# Patient Record
Sex: MALE | Race: WHITE | Employment: FULL TIME | URBAN - NONMETROPOLITAN AREA
[De-identification: names, ages, dates, MRNs, and addresses within clinical notes are randomized per-mention and may not be internally consistent; named-entity substitution may affect disease eponyms.]

---

## 2017-01-04 ENCOUNTER — OFFICE VISIT (OUTPATIENT)
Dept: FAMILY MEDICINE | Facility: OTHER | Age: 52
End: 2017-01-04
Payer: COMMERCIAL

## 2017-01-04 VITALS
HEIGHT: 74 IN | WEIGHT: 192 LBS | DIASTOLIC BLOOD PRESSURE: 62 MMHG | TEMPERATURE: 96.5 F | RESPIRATION RATE: 16 BRPM | HEART RATE: 59 BPM | SYSTOLIC BLOOD PRESSURE: 98 MMHG | BODY MASS INDEX: 24.64 KG/M2 | OXYGEN SATURATION: 99 %

## 2017-01-04 DIAGNOSIS — Z00.00 ENCOUNTER FOR ROUTINE ADULT HEALTH EXAMINATION WITHOUT ABNORMAL FINDINGS: Primary | ICD-10-CM

## 2017-01-04 DIAGNOSIS — Z13.6 CARDIOVASCULAR SCREENING; LDL GOAL LESS THAN 160: ICD-10-CM

## 2017-01-04 DIAGNOSIS — H90.3 ASYMMETRICAL SENSORINEURAL HEARING LOSS: ICD-10-CM

## 2017-01-04 DIAGNOSIS — Z23 NEED FOR VACCINATION: ICD-10-CM

## 2017-01-04 DIAGNOSIS — Z23 NEED FOR PROPHYLACTIC VACCINATION AND INOCULATION AGAINST INFLUENZA: ICD-10-CM

## 2017-01-04 LAB — GLUCOSE SERPL-MCNC: 109 MG/DL (ref 70–99)

## 2017-01-04 PROCEDURE — 90715 TDAP VACCINE 7 YRS/> IM: CPT | Performed by: FAMILY MEDICINE

## 2017-01-04 PROCEDURE — 82947 ASSAY GLUCOSE BLOOD QUANT: CPT | Mod: 90 | Performed by: FAMILY MEDICINE

## 2017-01-04 PROCEDURE — 36415 COLL VENOUS BLD VENIPUNCTURE: CPT | Performed by: FAMILY MEDICINE

## 2017-01-04 PROCEDURE — 99396 PREV VISIT EST AGE 40-64: CPT | Mod: 25 | Performed by: FAMILY MEDICINE

## 2017-01-04 PROCEDURE — 99000 SPECIMEN HANDLING OFFICE-LAB: CPT | Performed by: FAMILY MEDICINE

## 2017-01-04 PROCEDURE — 90471 IMMUNIZATION ADMIN: CPT | Performed by: FAMILY MEDICINE

## 2017-01-04 ASSESSMENT — PAIN SCALES - GENERAL: PAINLEVEL: NO PAIN (0)

## 2017-01-04 NOTE — NURSING NOTE
"Chief Complaint   Patient presents with     Physical       Initial BP 98/62 mmHg  Pulse 59  Temp(Src) 96.5  F (35.8  C) (Temporal)  Resp 16  Ht 6' 1.5\" (1.867 m)  Wt 192 lb (87.091 kg)  BMI 24.99 kg/m2  SpO2 99% Estimated body mass index is 24.99 kg/(m^2) as calculated from the following:    Height as of this encounter: 6' 1.5\" (1.867 m).    Weight as of this encounter: 192 lb (87.091 kg).  BP completed using cuff size: regular  Olivia Hough MA 1/4/2017        "

## 2017-01-04 NOTE — PROGRESS NOTES
SUBJECTIVE:     CC: Frandy Jerez is an 51 year old male who presents for preventative health visit.     Healthy Habits:    Do you get at least three servings of calcium containing foods daily (dairy, green leafy vegetables, etc.)? yes    Amount of exercise or daily activities, outside of work: 2 day(s) per week    Problems taking medications regularly not applicable    Medication side effects: No    Have you had an eye exam in the past two years? yes    Do you see a dentist twice per year? yes    Do you have sleep apnea, excessive snoring or daytime drowsiness?no        Hearing loss in right ear      Duration: past year noticed it    Description (location/character/radiation): na    Intensity:  NA    Accompanying signs and symptoms: NA    History (similar episodes/previous evaluation): None    Precipitating or alleviating factors: None    Therapies tried and outcome: None       Today's PHQ-2 Score:   PHQ-2 ( 1999 [employer]) 9/15/2016 3/1/2012   Q1: Little interest or pleasure in doing things 0 0   Q2: Feeling down, depressed or hopeless 0 0   PHQ-2 Score 0 0       Abuse: Current or Past(Physical, Sexual or Emotional)- No  Do you feel safe in your environment - Yes    Social History   Substance Use Topics     Smoking status: Never Smoker      Smokeless tobacco: Never Used     Alcohol Use: 0.0 oz/week     0 Standard drinks or equivalent per week      Comment: rare     The patient does not drink >3 drinks per day nor >7 drinks per week.    Last PSA: No results found for: PSA    Recent Labs   Lab Test  03/01/12   0843  01/27/11   1015   CHOL  187  226*   HDL  39*  42   LDL  128  155*   TRIG  98  146   CHOLHDLRATIO  5.0  5.0       Reviewed orders with patient. Reviewed health maintenance and updated orders accordingly - Yes    All Histories reviewed and updated in Epic.      ROS:  C: NEGATIVE for fever, chills, change in weight  I: NEGATIVE for worrisome rashes, moles or lesions  E: NEGATIVE for vision changes or  irritation  ENT: hearing loss  R: NEGATIVE for significant cough or SOB  CV: NEGATIVE for chest pain, palpitations or peripheral edema  GI: NEGATIVE for nausea, abdominal pain, heartburn, or change in bowel habits   male: negative for dysuria, hematuria, decreased urinary stream, erectile dysfunction, urethral discharge  M: NEGATIVE for significant arthralgias or myalgia  N: NEGATIVE for weakness, dizziness or paresthesias  P: NEGATIVE for changes in mood or affect    Problem list, Medication list, Allergies, and Medical/Social/Surgical histories reviewed in [facility] and updated as appropriate.  BP Readings from Last 3 Encounters:   01/04/17 98/62   10/19/16 114/52   09/15/16 124/70    Wt Readings from Last 3 Encounters:   01/04/17 192 lb (87.091 kg)   10/19/16 189 lb 9.6 oz (86.002 kg)   09/15/16 186 lb (84.369 kg)                  Patient Active Problem List   Diagnosis     CARDIOVASCULAR SCREENING; LDL GOAL LESS THAN 160     GERD (gastroesophageal reflux disease)     Past Surgical History   Procedure Laterality Date     Surgical history of -        great toenail removed, both feet     Surgical history of -        wisdom teeth     Colonoscopy  2009     Hernia repair, inguinal rt/lt  09/09/10     Bilateral     Eye surgery         Social History   Substance Use Topics     Smoking status: Never Smoker      Smokeless tobacco: Never Used     Alcohol Use: 0.0 oz/week     0 Standard drinks or equivalent per week      Comment: rare     Family History   Problem Relation Age of Onset     Thyroid Disease Mother      Asthma Father      Colon Cancer No family hx of      Breast Cancer No family hx of      Prostate Cancer No family hx of      DIABETES No family hx of      Coronary Artery Disease Paternal Grandfather      Hypertension No family hx of      Hyperlipidemia No family hx of      CEREBROVASCULAR DISEASE No family hx of      Macular Degeneration Mother          No current outpatient prescriptions on file.     No Known  "Allergies  Recent Labs   Lab Test  03/01/12   0843  01/27/11   1015   LDL  128  155*   HDL  39*  42   TRIG  98  146   ALT  46   --    CR  0.97   --    GFRESTIMATED  84   --    GFRESTBLACK  >90   --    POTASSIUM  4.2   --       OBJECTIVE:     BP 98/62 mmHg  Pulse 59  Temp(Src) 96.5  F (35.8  C) (Temporal)  Resp 16  Ht 6' 1.5\" (1.867 m)  Wt 192 lb (87.091 kg)  BMI 24.99 kg/m2  SpO2 99%  EXAM:  GENERAL: healthy, alert and no distress  EYES: Eyes grossly normal to inspection, PERRL and conjunctivae and sclerae normal  HENT: ear canals and TM's normal, nose and mouth without ulcers or lesions  NECK: no adenopathy, no asymmetry, masses, or scars and thyroid normal to palpation  RESP: lungs clear to auscultation - no rales, rhonchi or wheezes  CV: regular rate and rhythm, normal S1 S2, no S3 or S4, no murmur, click or rub, no peripheral edema and peripheral pulses strong  ABDOMEN: soft, nontender, no hepatosplenomegaly, no masses and bowel sounds normal   (male): normal male genitalia without lesions or urethral discharge, no hernia  MS: no gross musculoskeletal defects noted, no edema  SKIN: no suspicious lesions or rashes  NEURO: Normal strength and tone, mentation intact and speech normal  PSYCH: mentation appears normal, affect normal/bright  LYMPH: no cervical, supraclavicular, axillary, or inguinal adenopathy    ASSESSMENT/PLAN:         ICD-10-CM    1. CARDIOVASCULAR SCREENING; LDL GOAL LESS THAN 160 Z13.6 Lipid panel reflex to direct LDL   2. Encounter for routine adult health examination without abnormal findings Z00.00 GLUCOSE     Lipid panel reflex to direct LDL   3. Need for prophylactic vaccination and inoculation against influenza Z23 TDAP VACCINE (BOOSTRIX AGES 10-64)   4. Asymmetrical sensorineural hearing loss H90.5 AUDIOLOGY ADULT REFERRAL       COUNSELING:  Reviewed preventive health counseling, as reflected in patient instructions       Regular exercise       Healthy diet/nutrition       Vision " "screening       Vaccinated for: TDAP         reports that he has never smoked. He has never used smokeless tobacco.    Estimated body mass index is 24.99 kg/(m^2) as calculated from the following:    Height as of this encounter: 6' 1.5\" (1.867 m).    Weight as of this encounter: 192 lb (87.091 kg).       Counseling Resources:  ATP IV Guidelines  Pooled Cohorts Equation Calculator  FRAX Risk Assessment  ICSI Preventive Guidelines  Dietary Guidelines for Americans, 2010  USDA's MyPlate  ASA Prophylaxis  Lung CA Screening    Tk Acevedo MD  [facility]  "

## 2017-01-04 NOTE — PATIENT INSTRUCTIONS
Preventive Health Recommendations  Male Ages 40 to 49    Yearly exam:             See your health care provider every year in order to  o   Review health changes.   o   Discuss preventive care.    o   Review your medicines if your doctor has prescribed any.    You should be tested each year for STDs (sexually transmitted diseases) if you re at risk.     Have a cholesterol test every 5 years.     Have a colonoscopy (test for colon cancer) if someone in your family has had colon cancer or polyps before age 50.     After age 45, have a diabetes test (fasting glucose). If you are at risk for diabetes, you should have this test every 3 years.      Talk with your health care provider about whether or not a prostate cancer screening test (PSA) is right for you.    Shots: Get a flu shot each year. Get a tetanus shot every 10 years.     Nutrition:    Eat at least 5 servings of fruits and vegetables daily.     Eat whole-grain bread, whole-wheat pasta and brown rice instead of white grains and rice.     Talk to your provider about Calcium and Vitamin D.     Lifestyle    Exercise for at least 150 minutes a week (30 minutes a day, 5 days a week). This will help you control your weight and prevent disease.     Limit alcohol to one drink per day.     No smoking.     Wear sunscreen to prevent skin cancer.     See your dentist every six months for an exam and cleaning.

## 2017-01-04 NOTE — MR AVS SNAPSHOT
After Visit Summary   1/4/2017    Frandy Jerez    MRN: [id number]           Patient Information     Date Of Birth          1965        Visit Information        Provider Department      1/4/2017 8:20 AM Tk Acevedo MD [facility]        Today's Diagnoses     CARDIOVASCULAR SCREENING; LDL GOAL LESS THAN 160    -  1     Encounter for routine adult health examination without abnormal findings         Need for prophylactic vaccination and inoculation against influenza         Asymmetrical sensorineural hearing loss           Care Instructions      Preventive Health Recommendations  Male Ages 40 to 49    Yearly exam:             See your health care provider every year in order to  o   Review health changes.   o   Discuss preventive care.    o   Review your medicines if your doctor has prescribed any.    You should be tested each year for STDs (sexually transmitted diseases) if you re at risk.     Have a cholesterol test every 5 years.     Have a colonoscopy (test for colon cancer) if someone in your family has had colon cancer or polyps before age 50.     After age 45, have a diabetes test (fasting glucose). If you are at risk for diabetes, you should have this test every 3 years.      Talk with your health care provider about whether or not a prostate cancer screening test (PSA) is right for you.    Shots: Get a flu shot each year. Get a tetanus shot every 10 years.     Nutrition:    Eat at least 5 servings of fruits and vegetables daily.     Eat whole-grain bread, whole-wheat pasta and brown rice instead of white grains and rice.     Talk to your provider about Calcium and Vitamin D.     Lifestyle    Exercise for at least 150 minutes a week (30 minutes a day, 5 days a week). This will help you control your weight and prevent disease.     Limit alcohol to one drink per day.     No smoking.     Wear sunscreen to prevent skin cancer.     See your dentist every six months for an exam and  "cleaning.            Follow-ups after your visit        Additional Services     AUDIOLOGY ADULT REFERRAL       Your provider has referred you to: [facility]: [facility] [phone]   [email or web address]    Specialty Testing:  Audiogram w/Tymps and Reflexes (Comprehensive Audiology Evaluation)                  Follow-up notes from your care team     Return in about 1 year (around 1/4/2018) for Physical Exam.      Future tests that were ordered for you today     Open Future Orders        Priority Expected Expires Ordered    Lipid panel reflex to direct LDL Routine  1/4/2018 1/4/2017            Who to contact     If you have questions or need follow up information about today's clinic visit or your schedule please contact [facility] directly at [phone].  Normal or non-critical lab and imaging results will be communicated to you by MyChart, letter or phone within 4 business days after the clinic has received the results. If you do not hear from us within 7 days, please contact the clinic through [employer] or phone. If you have a critical or abnormal lab result, we will notify you by phone as soon as possible.  Submit refill requests through [employer] or call your pharmacy and they will forward the refill request to us. Please allow 3 business days for your refill to be completed.          Additional Information About Your Visit        [employer] Information     [employer] lets you send messages to your doctor, view your test results, renew your prescriptions, schedule appointments and more. To sign up, go to [email or web address] . Click on \"Log in\" on the left side of the screen, which will take you to the Welcome page. Then click on \"Sign up Now\" on the right side of the page.     You will be asked to enter the access code listed below, as well as some personal information. Please follow the directions to create your username and password.     Your " "access code is: [email or web address]  Expires: 2017  9:07 AM     Your access code will  in 90 days. If you need help or a new code, please call your [place] clinic or [phone].        Care EveryWhere ID     This is your Care EveryWhere ID. This could be used by other organizations to access your [place] medical records  [id number]        Your Vitals Were     Pulse Temperature Respirations Height BMI (Body Mass Index) Pulse Oximetry    59 96.5  F (35.8  C) (Temporal) 16 6' 1.5\" (1.867 m) 24.99 kg/m2 99%       Blood Pressure from Last 3 Encounters:   17 98/62   10/19/16 114/52   09/15/16 124/70    Weight from Last 3 Encounters:   17 192 lb (87.091 kg)   10/19/16 189 lb 9.6 oz (86.002 kg)   09/15/16 186 lb (84.369 kg)              We Performed the Following     AUDIOLOGY ADULT REFERRAL     GLUCOSE     TDAP VACCINE (BOOSTRIX AGES 10-64)        Primary Care Provider Office Phone # Fax #    Geraldo Avendano [phone]       [facility] [address] [place] [address]        Thank you!     Thank you for choosing [facility]  for your care. Our goal is always to provide you with excellent care. Hearing back from our patients is one way we can continue to improve our services. Please take a few minutes to complete the written survey that you may receive in the mail after your visit with us. Thank you!             Your Updated Medication List - Protect others around you: Learn how to safely use, store and throw away your medicines at www.disposemymeds.org.      Notice  As of 2017  9:07 AM    You have not been prescribed any medications.      "

## 2017-01-04 NOTE — NURSING NOTE
Screening Questionnaire for Adult Immunization    Are you sick today?   No   Do you have allergies to medications, food, a vaccine component or latex?   No   Have you ever had a serious reaction after receiving a vaccination?   No   Do you have a long-term health problem with heart disease, lung disease, asthma, kidney disease, metabolic disease (e.g. diabetes), anemia, or other blood disorder?   No   Do you have cancer, leukemia, HIV/AIDS, or any other immune system problem?   No   In the past 3 months, have you taken medications that affect  your immune system, such as prednisone, other steroids, or anticancer drugs; drugs for the treatment of rheumatoid arthritis, Crohn s disease, or psoriasis; or have you had radiation treatments?   No   Have you had a seizure, or a brain or other nervous system problem?   No   During the past year, have you received a transfusion of blood or blood     products, or been given immune (gamma) globulin or antiviral drug?   No   For women: Are you pregnant or is there a chance you could become        pregnant during the next month?   No   Have you received any vaccinations in the past 4 weeks?   No     Immunization questionnaire answers were all negative.      MNVFC doesn't apply on this patient    Per orders of Dr. Acevedo, injection of Dtap given by Uzma Hough. Patient instructed to remain in clinic for 20 minutes afterwards, and to report any adverse reaction to me immediately.       Screening performed by Uzma Hough on 1/4/2017 at 9:40 AM.

## 2017-01-13 ENCOUNTER — TELEPHONE (OUTPATIENT)
Dept: FAMILY MEDICINE | Facility: OTHER | Age: 52
End: 2017-01-13

## 2017-01-13 NOTE — TELEPHONE ENCOUNTER
Please call patient. Orders for glucose and lipids placed but unsure if completed.   Electronically signed by Tk Acevedo MD

## 2017-01-13 NOTE — TELEPHONE ENCOUNTER
Patient stated that he had had his cholesterol checked recently at a work health screening and it was normal. Olivia Crespo MA     1/13/2017

## 2017-01-13 NOTE — TELEPHONE ENCOUNTER
Reason for Call:  Request for results:    Name of test or procedure: lab    Date of test of procedure: 1/4/17    Location of the test or procedure: [facility]    OK to leave the result message on voice mail or with a family member? YES    Phone number Patient can be reached at:      Additional comments:     Call taken on 1/13/2017 at 10:22 AM by Kaitlynn Mchugh

## 2018-01-23 ENCOUNTER — TELEPHONE (OUTPATIENT)
Dept: FAMILY MEDICINE | Facility: OTHER | Age: 53
End: 2018-01-23

## 2018-01-23 NOTE — TELEPHONE ENCOUNTER
Reason for Call:  WED Day Appointment, Requested Provider:  Tk Acevedo M.D.    PCP: Geraldo Avendano    Reason for visit: Patient states he thinks he broke his collar bone & is requesting to see Dr Acevedo on Wed, patient was advised PCP is not in clinic today    Duration of symptoms: since Fri-boot hockey on ice    Have you been treated for this in the past? No    Additional comments:     Can we leave a detailed message on this number? YES    Phone number patient can be reached at: Home number on file [phone] (home)    Best Time: anytime    Call taken on 1/23/2018 at 10:03 AM by Alicia Perez

## 2018-01-23 NOTE — TELEPHONE ENCOUNTER
OK [place]in [place]. Please call patient  to schedule time.  Electronically signed by Tk Acevedo MD

## 2018-01-24 ENCOUNTER — OFFICE VISIT (OUTPATIENT)
Dept: FAMILY MEDICINE | Facility: CLINIC | Age: 53
End: 2018-01-24
Payer: COMMERCIAL

## 2018-01-24 ENCOUNTER — HOSPITAL ENCOUNTER (OUTPATIENT)
Dept: GENERAL RADIOLOGY | Facility: CLINIC | Age: 53
Discharge: HOME | End: 2018-01-24
Attending: FAMILY MEDICINE | Admitting: FAMILY MEDICINE
Payer: COMMERCIAL

## 2018-01-24 VITALS
TEMPERATURE: 97.7 F | SYSTOLIC BLOOD PRESSURE: 126 MMHG | HEART RATE: 73 BPM | DIASTOLIC BLOOD PRESSURE: 66 MMHG | BODY MASS INDEX: 24.92 KG/M2 | WEIGHT: 184 LBS | RESPIRATION RATE: 16 BRPM | HEIGHT: 72 IN | OXYGEN SATURATION: 99 %

## 2018-01-24 DIAGNOSIS — M89.8X1 CLAVICLE PAIN: Primary | ICD-10-CM

## 2018-01-24 DIAGNOSIS — M89.8X1 CLAVICLE PAIN: ICD-10-CM

## 2018-01-24 DIAGNOSIS — S43.52XA ACROMIOCLAVICULAR SPRAIN, LEFT, INITIAL ENCOUNTER: ICD-10-CM

## 2018-01-24 PROCEDURE — 73000 X-RAY EXAM OF COLLAR BONE: CPT | Mod: TC

## 2018-01-24 PROCEDURE — 99213 OFFICE O/P EST LOW 20 MIN: CPT | Performed by: FAMILY MEDICINE

## 2018-01-24 ASSESSMENT — PAIN SCALES - GENERAL: PAINLEVEL: NO PAIN (1)

## 2018-01-24 NOTE — PROGRESS NOTES
SUBJECTIVE:   Frandy Jerez is a 52 year old male who presents to clinic today for the following health issues:      Joint Pain    Onset: 01/19/18    Description:   Location: left shoulder- collarbone  Character: Dull ache, sometimes sharp with certain movements    Intensity: moderate    Progression of Symptoms: same    Accompanying Signs & Symptoms:  Other symptoms: swelling- minor, discoloration- some minor yellowing in the area    History:   Previous similar pain: YES- he fractured his shoulder 22 years ago      Precipitating factors:   Trauma or overuse: YES- he was playing boot hockey and fell on the ice landing on left shoulder    Alleviating factors:  Improved by: rest/inactivity, ibu    Therapies Tried and outcome: ibu            Problem list and histories reviewed & adjusted, as indicated.  Additional history: as documented    Patient Active Problem List   Diagnosis     CARDIOVASCULAR SCREENING; LDL GOAL LESS THAN 160     GERD (gastroesophageal reflux disease)     Past Surgical History:   Procedure Laterality Date     COLONOSCOPY  2009     EYE SURGERY       HERNIA REPAIR, INGUINAL RT/LT  09/09/10    Bilateral     SURGICAL HISTORY OF -       great toenail removed, both feet     SURGICAL HISTORY OF -       wisdom teeth       Social History   Substance Use Topics     Smoking status: Never Smoker     Smokeless tobacco: Never Used     Alcohol use 0.0 oz/week     0 Standard drinks or equivalent per week      Comment: rare     Family History   Problem Relation Age of Onset     Thyroid Disease Mother      Asthma Father      Colon Cancer No family hx of      Breast Cancer No family hx of      Prostate Cancer No family hx of      DIABETES No family hx of      Coronary Artery Disease Paternal Grandfather      Hypertension No family hx of      Hyperlipidemia No family hx of      CEREBROVASCULAR DISEASE No family hx of      Macular Degeneration Mother          Current Outpatient Prescriptions   Medication Sig Dispense  "Refill     IBUPROFEN PO        No Known Allergies  Recent Labs   Lab Test  03/01/12   0843  01/27/11   1015   LDL  128  155*   HDL  39*  42   TRIG  98  146   ALT  46   --    CR  0.97   --    GFRESTIMATED  84   --    GFRESTBLACK  >90   --    POTASSIUM  4.2   --       BP Readings from Last 3 Encounters:   01/24/18 126/66   01/04/17 98/62   10/19/16 114/52    Wt Readings from Last 3 Encounters:   01/24/18 184 lb (83.5 kg)   01/04/17 192 lb (87.1 kg)   10/19/16 189 lb 9.6 oz (86 kg)                  Labs reviewed in EPIC    Reviewed and updated as needed this visit by clinical staff  Tobacco  Allergies  Meds  Problems  Soc Hx      Reviewed and updated as needed this visit by Provider  Allergies  Meds  Problems         ROS:  Constitutional, HEENT, cardiovascular, pulmonary, gi and gu systems are negative, except as otherwise noted.    OBJECTIVE:     /66 (BP Location: Left arm, Patient Position: Chair, Cuff Size: Adult Large)  Pulse 73  Temp 97.7  F (36.5  C) (Tympanic)  Resp 16  Ht 6' 0.3\" (1.836 m)  Wt 184 lb (83.5 kg)  SpO2 99%  BMI 24.75 kg/m2  Body mass index is 24.75 kg/(m^2).  GENERAL: healthy, alert and no distress  NECK: no adenopathy, no asymmetry, masses, or scars and thyroid normal to palpation  RESP: lungs clear to auscultation - no rales, rhonchi or wheezes  CV: regular rate and rhythm, normal S1 S2, no S3 or S4, no murmur, click or rub, no peripheral edema and peripheral pulses strong  ABDOMEN: soft, nontender, no hepatosplenomegaly, no masses and bowel sounds normal  MS: Shoulder exam shows positive impingement signs are present with pain at high arc of abduction and forward flexion on left. There is tenderness of the AC joint, Old deformity midclavicular non tender. .    Diagnostic Test Results:  Xray - Left clavicle no acute fracture. Healed old fracture.     ASSESSMENT/PLAN:       1. Clavicle pain  Acute distal clavicle pain after fall on shoulder. No acute fracture.  - XR Clavicle " Left; Future    2. Acromioclavicular sprain, left, initial encounter  Acute AC sprain with minimal step off. Rest the affected painful area as much as possible.  Apply ice for 15-20 minutes intermittently as needed and especially after any offending activity. Daily stretching.  As pain recedes, begin normal activities slowly as tolerated.  Consider Physical Therapy if symptoms not better with symptomatic care.       Patient Instructions       AC Joint Sprain (Adult)    The AC or acromioclavicular joint is at the end of the collar bone, or clavicle, near the shoulder. The AC joint is made of 4 ligaments that hold the collar bone to the shoulder blade, or scapula. With an AC joint sprain, these ligaments may be partly or fully torn. In both cases this causes pain and swelling at the end of the collar bone. If the ligaments are completely torn, the collar bone will rise up.  AC joint sprains are given a grade depending on whether they are mild, moderate, or severe:    Grade 1. A mild sprain, with minor damage to the ligaments. The collar bone stays in place.    Grade 2. A moderate sprain. The ligaments are partly torn. The collar bone is moved out of place. The injured shoulder may look lower and flatter than the other shoulder.    Grade 3. The most severe kind of sprain. The ligaments are completely torn. The collar bone is no longer joined to the shoulder blade. The collar bone rises up. This creates a bump on top of the shoulder. The ligaments heal in this position, so the bump does not go away. It is possible to have surgery to correct the bump. But normal shoulder function will return even without surgery.  An AC sprain will take up to 6 weeks to heal, depending on how severe it is. It is often treated with a sling. Or a sling and an elastic wrap around the chest may be used. Physical therapy may be needed to help the shoulder keep full range of motion. Once healed, you can expect full recovery of shoulder  function.  Home care    Your provider may prescribe medicines for pain. Or you may use over-the-counter pain medicines. Talk with your provider beforetaking medicines if you have chronic liver or kidney disease, or have ever had a stomach ulcer or GI (gastrointestinal) bleeding.    Make an appointment right away to see your provider or an orthopedic or bone doctor, for further evaluation and treatment of the injury.    Use the injured area as little as possible. This will help decrease pain and swelling and allow the area to heal.    Place an ice pack over the injured area for 15 minutes. Do this every 4 to 6 hours for the first 24 to 48 hours, or as directed. Keep using ice packs to ease pain and swelling as directed by your provider or the orthopedic doctor.    To make an ice pack, put ice cubes in a plastic bag that seals at the top. Wrap the bag in a clean, thin towel or cloth. Never put ice or an ice pack directly on the skin. The ice pack can be put right on the wrap or sling. As the ice melts, be careful to not get the wrap or sling wet.    A sling alone is often enough. Sometime a sling and a wrap around the chest may be used to help ease pain, if needed. This also helps keep your injured arm from moving. Use these devices as advised until you are seen by your healthcare provider or the orthopedic doctor. Always ask when the wrap should be worn. Always ask when the wrap can be removed.    Wear the sling while awake or as advised, until you are scheduled to see your healthcare provider or an orthopedic doctor. You may remove the sling to sleep. You may remove the sling to bathe. Make sure the sling is comfortable and keeps your arm raised, as advised by the provider. Follow the provider s instructions on how to use the sling. Always ask when you should wear the sling. Always ask when the sling can be removed.    Shoulder joints become stiff if they are kept still for too long. Talk with your healthcare  provider or the orthopedic doctor about range of motion exercises and possible physical therapy.  Follow-up care  Follow up with your healthcare provider, or as advised. Your provider may refer you to a specialist such as an orthopedic, or bone, doctor.  If X-rays were taken, you will be told of any new findings that may affect your care.  When to seek medical advice  Call your healthcare provider right away if any of these occur:    Your shoulder looks off-balance    You have increased pain, swelling, or bruising    You have increased pain even after using prescribed pain medicine    You have continuing pain     Your hand or arm becomes cold, blue, numb, or tingly    You have trouble moving your shoulder, wrist, or elbow due to stiffness  Date Last Reviewed: 10/8/2015    4471-8502 [employer]. [address] [place] [address]. All rights reserved. This information is not intended as a substitute for professional medical care. Always follow your healthcare professional's instructions.        Understanding AC Joint Sprain    The AC (acromioclavicular) joint is where the shoulder blade (scapula) meets the collarbone (clavicle). The highest point of the shoulder blade is called the acromion. Strong tissues called ligaments connect the acromion to the collarbone, forming the AC joint.  An AC joint sprain occurs when an injury damages the ligaments in the AC joint.  What causes AC joint sprain?  Often an accident or injury forces the AC joint apart. This may include:    Falling onto your shoulder    Falling onto an outstretched hand    Getting a direct blow to your shoulder  Symptoms of AC joint sprain  Symptoms can vary depending on how serious the injury is. They can include:    Shoulder pain    Shoulder that feels sore when touched    Swelling    Bruising    Change in the shoulder s shape    Bulge above the shoulder    Shoulder that appears to droop    Collarbone that moves upward    Limited  movement in the shoulder  Treatment for AC joint sprain  Treatment will depend on how serious the strain is. It will also depend on whether you have damage to other parts of the shoulder. Treatment may include:    Rest. This allows your shoulder to heal. You should avoid activities that stress the joint. This includes reaching overhead or sleeping on your shoulder.    Sling. This protects the shoulder and holds the joint in a good position for healing.    Cold packs. These help reduce swelling and relieve pain.    Prescription or over-the-counter pain medicines. These help relieve pain and swelling.    Arm and shoulder exercises. These help keep the shoulder joint mobile as it heals. They also help improve muscle strength around the joint.     When to call your healthcare provider  Call your healthcare provider right away if you have any of these:    Fever of 100.4 F (38 C) or higher, or as directed    Symptoms that don t get better or get worse    New symptoms   Date Last Reviewed: 3/10/2016    0494-0561 [employer]. [address] [place] [address], [place] [address]. All rights reserved. This information is not intended as a substitute for professional medical care. Always follow your healthcare professional's instructions.            kT Acevedo MD  [facility]

## 2018-01-24 NOTE — MR AVS SNAPSHOT
After Visit Summary   1/24/2018    Frandy Jerez    MRN: [id number]           Patient Information     Date Of Birth          1965        Visit Information        Provider Department      1/24/2018 5:00 PM Tk Acevedo MD [facility]        Today's Diagnoses     Clavicle pain    -  1    Acromioclavicular sprain, left, initial encounter          Care Instructions      AC Joint Sprain (Adult)    The AC or acromioclavicular joint is at the end of the collar bone, or clavicle, near the shoulder. The AC joint is made of 4 ligaments that hold the collar bone to the shoulder blade, or scapula. With an AC joint sprain, these ligaments may be partly or fully torn. In both cases this causes pain and swelling at the end of the collar bone. If the ligaments are completely torn, the collar bone will rise up.  AC joint sprains are given a grade depending on whether they are mild, moderate, or severe:    Grade 1. A mild sprain, with minor damage to the ligaments. The collar bone stays in place.    Grade 2. A moderate sprain. The ligaments are partly torn. The collar bone is moved out of place. The injured shoulder may look lower and flatter than the other shoulder.    Grade 3. The most severe kind of sprain. The ligaments are completely torn. The collar bone is no longer joined to the shoulder blade. The collar bone rises up. This creates a bump on top of the shoulder. The ligaments heal in this position, so the bump does not go away. It is possible to have surgery to correct the bump. But normal shoulder function will return even without surgery.  An AC sprain will take up to 6 weeks to heal, depending on how severe it is. It is often treated with a sling. Or a sling and an elastic wrap around the chest may be used. Physical therapy may be needed to help the shoulder keep full range of motion. Once healed, you can expect full recovery of shoulder function.  Home care    Your provider may  prescribe medicines for pain. Or you may use over-the-counter pain medicines. Talk with your provider beforetaking medicines if you have chronic liver or kidney disease, or have ever had a stomach ulcer or GI (gastrointestinal) bleeding.    Make an appointment right away to see your provider or an orthopedic or bone doctor, for further evaluation and treatment of the injury.    Use the injured area as little as possible. This will help decrease pain and swelling and allow the area to heal.    Place an ice pack over the injured area for 15 minutes. Do this every 4 to 6 hours for the first 24 to 48 hours, or as directed. Keep using ice packs to ease pain and swelling as directed by your provider or the orthopedic doctor.    To make an ice pack, put ice cubes in a plastic bag that seals at the top. Wrap the bag in a clean, thin towel or cloth. Never put ice or an ice pack directly on the skin. The ice pack can be put right on the wrap or sling. As the ice melts, be careful to not get the wrap or sling wet.    A sling alone is often enough. Sometime a sling and a wrap around the chest may be used to help ease pain, if needed. This also helps keep your injured arm from moving. Use these devices as advised until you are seen by your healthcare provider or the orthopedic doctor. Always ask when the wrap should be worn. Always ask when the wrap can be removed.    Wear the sling while awake or as advised, until you are scheduled to see your healthcare provider or an orthopedic doctor. You may remove the sling to sleep. You may remove the sling to bathe. Make sure the sling is comfortable and keeps your arm raised, as advised by the provider. Follow the provider s instructions on how to use the sling. Always ask when you should wear the sling. Always ask when the sling can be removed.    Shoulder joints become stiff if they are kept still for too long. Talk with your healthcare provider or the orthopedic doctor about range of  motion exercises and possible physical therapy.  Follow-up care  Follow up with your healthcare provider, or as advised. Your provider may refer you to a specialist such as an orthopedic, or bone, doctor.  If X-rays were taken, you will be told of any new findings that may affect your care.  When to seek medical advice  Call your healthcare provider right away if any of these occur:    Your shoulder looks off-balance    You have increased pain, swelling, or bruising    You have increased pain even after using prescribed pain medicine    You have continuing pain     Your hand or arm becomes cold, blue, numb, or tingly    You have trouble moving your shoulder, wrist, or elbow due to stiffness  Date Last Reviewed: 10/8/2015    5351-8218 [employer]. [address] [place] [address], [place] [id number]. All rights reserved. This information is not intended as a substitute for professional medical care. Always follow your healthcare professional's instructions.        Understanding AC Joint Sprain    The AC (acromioclavicular) joint is where the shoulder blade (scapula) meets the collarbone (clavicle). The highest point of the shoulder blade is called the acromion. Strong tissues called ligaments connect the acromion to the collarbone, forming the AC joint.  An AC joint sprain occurs when an injury damages the ligaments in the AC joint.  What causes AC joint sprain?  Often an accident or injury forces the AC joint apart. This may include:    Falling onto your shoulder    Falling onto an outstretched hand    Getting a direct blow to your shoulder  Symptoms of AC joint sprain  Symptoms can vary depending on how serious the injury is. They can include:    Shoulder pain    Shoulder that feels sore when touched    Swelling    Bruising    Change in the shoulder s shape    Bulge above the shoulder    Shoulder that appears to droop    Collarbone that moves upward    Limited movement in the shoulder  Treatment for AC joint  sprain  Treatment will depend on how serious the strain is. It will also depend on whether you have damage to other parts of the shoulder. Treatment may include:    Rest. This allows your shoulder to heal. You should avoid activities that stress the joint. This includes reaching overhead or sleeping on your shoulder.    Sling. This protects the shoulder and holds the joint in a good position for healing.    Cold packs. These help reduce swelling and relieve pain.    Prescription or over-the-counter pain medicines. These help relieve pain and swelling.    Arm and shoulder exercises. These help keep the shoulder joint mobile as it heals. They also help improve muscle strength around the joint.     When to call your healthcare provider  Call your healthcare provider right away if you have any of these:    Fever of 100.4 F (38 C) or higher, or as directed    Symptoms that don t get better or get worse    New symptoms   Date Last Reviewed: 3/10/2016    0175-6534 [employer]. [address] [place] [address]. All rights reserved. This information is not intended as a substitute for professional medical care. Always follow your healthcare professional's instructions.                Follow-ups after your visit        Follow-up notes from your care team     Return in about 2 weeks (around 2/7/2018), or if symptoms worsen or fail to improve.      Your next 10 appointments already scheduled     Feb 09, 2018  9:20 AM CST   PHYSICAL with Tk Acevedo MD   [facility] ([facility]    [address] [facility] [id number] [phone]              Future tests that were ordered for you today     Open Future Orders        Priority Expected Expires Ordered    XR Clavicle Left Routine 1/24/2018 1/24/2019 1/24/2018            Who to contact     If you have questions or need follow up information about today's clinic visit or your schedule please contact [facility]  "directly at [phone].  Normal or non-critical lab and imaging results will be communicated to you by [employer], letter or phone within 4 business days after the clinic has received the results. If you do not hear from us within 7 days, please contact the clinic through [employer] or phone. If you have a critical or abnormal lab result, we will notify you by phone as soon as possible.  Submit refill requests through [employer] or call your pharmacy and they will forward the refill request to us. Please allow 3 business days for your refill to be completed.          Additional Information About Your Visit        [employer] Information     [employer] lets you send messages to your doctor, view your test results, renew your prescriptions, schedule appointments and more. To sign up, go to [email or web address] . Click on \"Log in\" on the left side of the screen, which will take you to the Welcome page. Then click on \"Sign up Now\" on the right side of the page.     You will be asked to enter the access code listed below, as well as some personal information. Please follow the directions to create your username and password.     Your access code is: [email or web address]  Expires: 2018  5:28 PM     Your access code will  in 90 days. If you need help or a new code, please call your [facility] clinic or [phone].        Care EveryWhere ID     This is your Care EveryWhere ID. This could be used by other organizations to access your [facility] medical records  [id number]        Your Vitals Were     Pulse Temperature Respirations Height Pulse Oximetry BMI (Body Mass Index)    73 97.7  F (36.5  C) (Tympanic) 16 6' 0.3\" (1.836 m) 99% 24.75 kg/m2       Blood Pressure from Last 3 Encounters:   18 126/66   17 98/62   10/19/16 114/52    Weight from Last 3 Encounters:   18 184 lb (83.5 kg)   17 192 lb (87.1 kg)   10/19/16 189 lb 9.6 oz (86 kg)               Primary Care Provider Office Phone # Fax #    Ross " MD [place] [phone]       [address] [facility] [address]        Equal Access to Services     RAFAL MARTINEZ : Leopoldo Matson, nicolasa pollard, víctor hernandezmasummer dela cruz, lana bluepaulettebillie covarrubias. So [facility] [phone].    ATENCIÓN: Si habla español, tiene a walls disposición servicios gratuitos de asistencia lingüística. Llame al [phone].    We comply with applicable federal civil rights laws and [place] laws. We do not discriminate on the basis of race, color, national origin, age, disability, sex, sexual orientation, or gender identity.            Thank you!     Thank you for choosing [facility]  for your care. Our goal is always to provide you with excellent care. Hearing back from our patients is one way we can continue to improve our services. Please take a few minutes to complete the written survey that you may receive in the mail after your visit with us. Thank you!             Your Updated Medication List - Protect others around you: Learn how to safely use, store and throw away your medicines at www.disposemymeds.org.          This list is accurate as of 1/24/18  5:30 PM.  Always use your most recent med list.                   Brand Name Dispense Instructions for use Diagnosis    IBUPROFEN PO

## 2018-01-24 NOTE — PATIENT INSTRUCTIONS
AC Joint Sprain (Adult)    The AC or acromioclavicular joint is at the end of the collar bone, or clavicle, near the shoulder. The AC joint is made of 4 ligaments that hold the collar bone to the shoulder blade, or scapula. With an AC joint sprain, these ligaments may be partly or fully torn. In both cases this causes pain and swelling at the end of the collar bone. If the ligaments are completely torn, the collar bone will rise up.  AC joint sprains are given a grade depending on whether they are mild, moderate, or severe:    Grade 1. A mild sprain, with minor damage to the ligaments. The collar bone stays in place.    Grade 2. A moderate sprain. The ligaments are partly torn. The collar bone is moved out of place. The injured shoulder may look lower and flatter than the other shoulder.    Grade 3. The most severe kind of sprain. The ligaments are completely torn. The collar bone is no longer joined to the shoulder blade. The collar bone rises up. This creates a bump on top of the shoulder. The ligaments heal in this position, so the bump does not go away. It is possible to have surgery to correct the bump. But normal shoulder function will return even without surgery.  An AC sprain will take up to 6 weeks to heal, depending on how severe it is. It is often treated with a sling. Or a sling and an elastic wrap around the chest may be used. Physical therapy may be needed to help the shoulder keep full range of motion. Once healed, you can expect full recovery of shoulder function.  Home care    Your provider may prescribe medicines for pain. Or you may use over-the-counter pain medicines. Talk with your provider beforetaking medicines if you have chronic liver or kidney disease, or have ever had a stomach ulcer or GI (gastrointestinal) bleeding.    Make an appointment right away to see your provider or an orthopedic or bone doctor, for further evaluation and treatment of the injury.    Use the injured area as  little as possible. This will help decrease pain and swelling and allow the area to heal.    Place an ice pack over the injured area for 15 minutes. Do this every 4 to 6 hours for the first 24 to 48 hours, or as directed. Keep using ice packs to ease pain and swelling as directed by your provider or the orthopedic doctor.    To make an ice pack, put ice cubes in a plastic bag that seals at the top. Wrap the bag in a clean, thin towel or cloth. Never put ice or an ice pack directly on the skin. The ice pack can be put right on the wrap or sling. As the ice melts, be careful to not get the wrap or sling wet.    A sling alone is often enough. Sometime a sling and a wrap around the chest may be used to help ease pain, if needed. This also helps keep your injured arm from moving. Use these devices as advised until you are seen by your healthcare provider or the orthopedic doctor. Always ask when the wrap should be worn. Always ask when the wrap can be removed.    Wear the sling while awake or as advised, until you are scheduled to see your healthcare provider or an orthopedic doctor. You may remove the sling to sleep. You may remove the sling to bathe. Make sure the sling is comfortable and keeps your arm raised, as advised by the provider. Follow the provider s instructions on how to use the sling. Always ask when you should wear the sling. Always ask when the sling can be removed.    Shoulder joints become stiff if they are kept still for too long. Talk with your healthcare provider or the orthopedic doctor about range of motion exercises and possible physical therapy.  Follow-up care  Follow up with your healthcare provider, or as advised. Your provider may refer you to a specialist such as an orthopedic, or bone, doctor.  If X-rays were taken, you will be told of any new findings that may affect your care.  When to seek medical advice  Call your healthcare provider right away if any of these occur:    Your shoulder  looks off-balance    You have increased pain, swelling, or bruising    You have increased pain even after using prescribed pain medicine    You have continuing pain     Your hand or arm becomes cold, blue, numb, or tingly    You have trouble moving your shoulder, wrist, or elbow due to stiffness  Date Last Reviewed: 10/8/2015    5501-3293 The [employer]. [address] [place], [place] [address]. All rights reserved. This information is not intended as a substitute for professional medical care. Always follow your healthcare professional's instructions.        Understanding AC Joint Sprain    The AC (acromioclavicular) joint is where the shoulder blade (scapula) meets the collarbone (clavicle). The highest point of the shoulder blade is called the acromion. Strong tissues called ligaments connect the acromion to the collarbone, forming the AC joint.  An AC joint sprain occurs when an injury damages the ligaments in the AC joint.  What causes AC joint sprain?  Often an accident or injury forces the AC joint apart. This may include:    Falling onto your shoulder    Falling onto an outstretched hand    Getting a direct blow to your shoulder  Symptoms of AC joint sprain  Symptoms can vary depending on how serious the injury is. They can include:    Shoulder pain    Shoulder that feels sore when touched    Swelling    Bruising    Change in the shoulder s shape    Bulge above the shoulder    Shoulder that appears to droop    Collarbone that moves upward    Limited movement in the shoulder  Treatment for AC joint sprain  Treatment will depend on how serious the strain is. It will also depend on whether you have damage to other parts of the shoulder. Treatment may include:    Rest. This allows your shoulder to heal. You should avoid activities that stress the joint. This includes reaching overhead or sleeping on your shoulder.    Sling. This protects the shoulder and holds the joint in a good position for  healing.    Cold packs. These help reduce swelling and relieve pain.    Prescription or over-the-counter pain medicines. These help relieve pain and swelling.    Arm and shoulder exercises. These help keep the shoulder joint mobile as it heals. They also help improve muscle strength around the joint.     When to call your healthcare provider  Call your healthcare provider right away if you have any of these:    Fever of 100.4 F (38 C) or higher, or as directed    Symptoms that don t get better or get worse    New symptoms   Date Last Reviewed: 3/10/2016    7621-2534 The [employer]. [address] [place] [address]. All rights reserved. This information is not intended as a substitute for professional medical care. Always follow your healthcare professional's instructions.

## 2018-01-24 NOTE — NURSING NOTE
"Chief Complaint   Patient presents with     Musculoskeletal Problem     lesly       Initial /66 (BP Location: Left arm, Patient Position: Chair, Cuff Size: Adult Large)  Pulse 73  Temp 97.7  F (36.5  C) (Tympanic)  Resp 16  Ht 6' 0.3\" (1.836 m)  Wt 184 lb (83.5 kg)  SpO2 99%  BMI 24.75 kg/m2 Estimated body mass index is 24.75 kg/(m^2) as calculated from the following:    Height as of this encounter: 6' 0.3\" (1.836 m).    Weight as of this encounter: 184 lb (83.5 kg).  Medication Reconciliation: complete   Health Maintenance Due   Topic Date Due     LIPID MONITORING Q5 YEARS  03/01/2017     INFLUENZA VACCINE (SYSTEM ASSIGNED)  09/01/2017     COLON CANCER SCREEN (SYSTEM ASSIGNED)  09/26/2017     Health Maintenance reviewed at today's visit patient asked to schedule/complete:   Routine Health Visit:  Patient agrees to schedule  Colon Cancer:  Patient declined   Lindsey Toribio, [facility]        "

## 2018-02-09 ENCOUNTER — OFFICE VISIT (OUTPATIENT)
Dept: FAMILY MEDICINE | Facility: OTHER | Age: 53
End: 2018-02-09
Payer: COMMERCIAL

## 2018-02-09 VITALS
WEIGHT: 179.9 LBS | DIASTOLIC BLOOD PRESSURE: 70 MMHG | HEIGHT: 73 IN | RESPIRATION RATE: 16 BRPM | SYSTOLIC BLOOD PRESSURE: 110 MMHG | OXYGEN SATURATION: 99 % | TEMPERATURE: 96.5 F | HEART RATE: 77 BPM | BODY MASS INDEX: 23.84 KG/M2

## 2018-02-09 DIAGNOSIS — M76.892 TENDINITIS OF LEFT HIP FLEXOR: ICD-10-CM

## 2018-02-09 DIAGNOSIS — Z00.00 ENCOUNTER FOR ROUTINE ADULT HEALTH EXAMINATION WITHOUT ABNORMAL FINDINGS: Primary | ICD-10-CM

## 2018-02-09 DIAGNOSIS — M79.18 BRACHIORADIALIS MUSCLE TENDERNESS: ICD-10-CM

## 2018-02-09 DIAGNOSIS — Z12.11 SPECIAL SCREENING FOR MALIGNANT NEOPLASMS, COLON: ICD-10-CM

## 2018-02-09 LAB
ANION GAP SERPL CALCULATED.3IONS-SCNC: 5 MMOL/L (ref 3–14)
BUN SERPL-MCNC: 16 MG/DL (ref 7–30)
CALCIUM SERPL-MCNC: 8.7 MG/DL (ref 8.5–10.1)
CHLORIDE SERPL-SCNC: 105 MMOL/L (ref 94–109)
CHOLEST SERPL-MCNC: 189 MG/DL
CO2 SERPL-SCNC: 30 MMOL/L (ref 20–32)
CREAT SERPL-MCNC: 0.96 MG/DL (ref 0.66–1.25)
GFR SERPL CREATININE-BSD FRML MDRD: 83 ML/MIN/1.7M2
GLUCOSE SERPL-MCNC: 86 MG/DL (ref 70–99)
HDLC SERPL-MCNC: 58 MG/DL
LDLC SERPL CALC-MCNC: 117 MG/DL
NONHDLC SERPL-MCNC: 131 MG/DL
POTASSIUM SERPL-SCNC: 4.1 MMOL/L (ref 3.4–5.3)
SODIUM SERPL-SCNC: 140 MMOL/L (ref 133–144)
TRIGL SERPL-MCNC: 71 MG/DL

## 2018-02-09 PROCEDURE — 99396 PREV VISIT EST AGE 40-64: CPT | Performed by: FAMILY MEDICINE

## 2018-02-09 PROCEDURE — 36415 COLL VENOUS BLD VENIPUNCTURE: CPT | Performed by: FAMILY MEDICINE

## 2018-02-09 PROCEDURE — 80061 LIPID PANEL: CPT | Performed by: FAMILY MEDICINE

## 2018-02-09 PROCEDURE — 80048 BASIC METABOLIC PNL TOTAL CA: CPT | Performed by: FAMILY MEDICINE

## 2018-02-09 ASSESSMENT — PAIN SCALES - GENERAL: PAINLEVEL: NO PAIN (0)

## 2018-02-09 NOTE — MR AVS SNAPSHOT
After Visit Summary   2/9/2018    Frandy Jerez    MRN: [id number]           Patient Information     Date Of Birth          1965        Visit Information        Provider Department      2/9/2018 9:20 AM Tk Acevedo MD [facility]        Today's Diagnoses     Encounter for routine adult health examination without abnormal findings    -  1    Special screening for malignant neoplasms, colon        Tendinitis of left hip flexor        Brachioradialis muscle tenderness          Care Instructions      Preventive Health Recommendations  Male Ages 50 - 64    Yearly exam:             See your health care provider every year in order to  o   Review health changes.   o   Discuss preventive care.    o   Review your medicines if your doctor has prescribed any.     Have a cholesterol test every 5 years, or more frequently if you are at risk for high cholesterol/heart disease.     Have a diabetes test (fasting glucose) every three years. If you are at risk for diabetes, you should have this test more often.     Have a colonoscopy at age 50, or have a yearly FIT test (stool test). These exams will check for colon cancer.      Talk with your health care provider about whether or not a prostate cancer screening test (PSA) is right for you.    You should be tested each year for STDs (sexually transmitted diseases), if you re at risk.     Shots: Get a flu shot each year. Get a tetanus shot every 10 years.     Nutrition:    Eat at least 5 servings of fruits and vegetables daily.     Eat whole-grain bread, whole-wheat pasta and brown rice instead of white grains and rice.     Talk to your provider about Calcium and Vitamin D.     Lifestyle    Exercise for at least 150 minutes a week (30 minutes a day, 5 days a week). This will help you control your weight and prevent disease.     Limit alcohol to one drink per day.     No smoking.     Wear sunscreen to prevent skin cancer.     See your dentist every six  months for an exam and cleaning.     See your eye doctor every 1 to 2 years.            Follow-ups after your visit        Additional Services     GASTROENTEROLOGY ADULT REF PROCEDURE ONLY [facility] [phone]; No Provider Preference       Last Lab Result: Creatinine (mg/dL)       Date                     Value                 03/01/2012               0.97             ----------  Body mass index is 24 kg/(m^2).      Patient will be contacted to schedule procedure.     Please be aware that coverage of these services is subject to the terms and limitations of your health insurance plan.  Call member services at your health plan with any benefit or coverage questions.  Any procedures must be performed at a [facility] facility OR coordinated by your clinic's referral office.    Please bring the following with you to your appointment:    (1) Any X-Rays, CTs or MRIs which have been performed.  Contact the facility where they were done to arrange for  prior to your scheduled appointment.    (2) List of current medications   (3) This referral request   (4) Any documents/labs given to you for this referral            PHYSICAL THERAPY REFERRAL       *This order will print in the [facility] Central Scheduling Office*    [facility] provides Physical Therapy evaluation and treatment and many specialty services across the [facility] system.  If requesting a specialty program, please choose from the list below.    Call one number to schedule at any [facility] location   [phone].    Treatment: Evaluation & Treatment  Special Instructions/Modalities: HEP with stretching  Special Programs: None    Please be aware that coverage of these services is subject to the terms and limitations of your health insurance plan.  Call member services at your health plan with any benefit or coverage questions.      **Note to Provider** To refer patients to  "therapy outside of the location list, change the order class to External Referral in the order composer.                  Follow-up notes from your care team     Return in about 1 year (around 2019) for Physical Exam.      Who to contact     If you have questions or need follow up information about today's clinic visit or your schedule please contact [facility] directly at [phone].  Normal or non-critical lab and imaging results will be communicated to you by [employer], letter or phone within 4 business days after the clinic has received the results. If you do not hear from us within 7 days, please contact the clinic through [employer] or phone. If you have a critical or abnormal lab result, we will notify you by phone as soon as possible.  Submit refill requests through [employer] or call your pharmacy and they will forward the refill request to us. Please allow 3 business days for your refill to be completed.          Additional Information About Your Visit        [employer] Information     [employer] lets you send messages to your doctor, view your test results, renew your prescriptions, schedule appointments and more. To sign up, go to [email or web address] . Click on \"Log in\" on the left side of the screen, which will take you to the Welcome page. Then click on \"Sign up Now\" on the right side of the page.     You will be asked to enter the access code listed below, as well as some personal information. Please follow the directions to create your username and password.     Your access code is: [email or web address]  Expires: 2018  5:28 PM     Your access code will  in 90 days. If you need help or a new code, please call your [facility] or [phone].        Care EveryWhere ID     This is your Care EveryWhere ID. This could be used by other organizations to access your [facility] medical records  [id number]        Your Vitals Were     Pulse Temperature Respirations Height Pulse Oximetry BMI " "(Body Mass Index)    77 96.5  F (35.8  C) (Temporal) 16 6' 0.6\" (1.844 m) 99% 24 kg/m2       Blood Pressure from Last 3 Encounters:   02/09/18 110/70   01/24/18 126/66   01/04/17 98/62    Weight from Last 3 Encounters:   02/09/18 179 lb 14.4 oz (81.6 kg)   01/24/18 184 lb (83.5 kg)   01/04/17 192 lb (87.1 kg)              We Performed the Following     Basic metabolic panel     GASTROENTEROLOGY ADULT REF PROCEDURE ONLY [facility] [phone]; No Provider Preference     Lipid Profile (Chol, Trig, HDL, LDL calc)     PHYSICAL THERAPY REFERRAL        Primary Care Provider Office Phone # Fax #    Tk Acevedo [phone]       [address] [facility] [address]        Equal Access to Services     RAFAL MARTINEZ : Hadii rufina blumo Sokayley, waaxda luqadaha, qaybta kaalmada adegoyoyasummer, lana castro . So [facility] [phone].    ATENCIÓN: Si habla español, tiene a walls disposición servicios gratuitos de asistencia lingüística. Llame al [phone].    We comply with applicable federal civil rights laws and [place] laws. We do not discriminate on the basis of race, color, national origin, age, disability, sex, sexual orientation, or gender identity.            Thank you!     Thank you for choosing [facility]  for your care. Our goal is always to provide you with excellent care. Hearing back from our patients is one way we can continue to improve our services. Please take a few minutes to complete the written survey that you may receive in the mail after your visit with us. Thank you!             Your Updated Medication List - Protect others around you: Learn how to safely use, store and throw away your medicines at www.disposemymeds.org.          This list is accurate as of 2/9/18 10:29 AM.  Always use your most recent med list.                   Brand Name Dispense Instructions for use Diagnosis    IBUPROFEN PO             "

## 2018-02-09 NOTE — NURSING NOTE
"Chief Complaint   Patient presents with     Physical       Initial /70 (BP Location: Left arm, Patient Position: Chair, Cuff Size: Adult Large)  Pulse 77  Temp 96.5  F (35.8  C) (Temporal)  Resp 16  Ht 6' 0.6\" (1.844 m)  Wt 179 lb 14.4 oz (81.6 kg)  SpO2 99%  BMI 24 kg/m2 Estimated body mass index is 24 kg/(m^2) as calculated from the following:    Height as of this encounter: 6' 0.6\" (1.844 m).    Weight as of this encounter: 179 lb 14.4 oz (81.6 kg).  Medication Reconciliation: complete     Autumn Sewell MA 2/9/2018  9:34 AM          "

## 2018-02-09 NOTE — PROGRESS NOTES
SUBJECTIVE:   CC: Frandy Jerez is an 52 year old male who presents for preventative health visit.     Physical   Annual:     Getting at least 3 servings of Calcium per day::  Yes    Bi-annual eye exam::  Yes    Dental care twice a year::  Yes    Sleep apnea or symptoms of sleep apnea::  None    Diet::  Regular (no restrictions)    Taking medications regularly::  Not Applicable    Additional concerns today::  YES (Left arm  pain and left leg pain. Has form to be competed for insurance.)                Musculoskeletal problem/pain      Duration: several weeks    Description  Location: Left forearm and left thigh    Intensity:  mild    Accompanying signs and symptoms: none    History  Previous similar problem: no   Previous evaluation:  none    Precipitating or alleviating factors:  Trauma or overuse: YES- lifting weights  Aggravating factors include: climbing stairs and lifting weights    Therapies tried and outcome: nothing      Today's PHQ-2 Score:   PHQ-2 ( 1999 [employer]) 2/9/2018   Q1: Little interest or pleasure in doing things 0   Q2: Feeling down, depressed or hopeless 0   PHQ-2 Score 0   Q1: Little interest or pleasure in doing things Not at all   Q2: Feeling down, depressed or hopeless Not at all   PHQ-2 Score 0       Abuse: Current or Past(Physical, Sexual or Emotional)- No  Do you feel safe in your environment - Yes    Social History   Substance Use Topics     Smoking status: Never Smoker     Smokeless tobacco: Never Used     Alcohol use 0.0 oz/week     0 Standard drinks or equivalent per week      Comment: rare     Alcohol Use 2/9/2018   If you drink alcohol, do you typically have greater than 3 drinks per day OR greater than 7 drinks per week?   No       Last PSA: No results found for: PSA    Reviewed orders with patient. Reviewed health maintenance and updated orders accordingly - Yes  Labs reviewed in EPIC  BP Readings from Last 3 Encounters:   02/09/18 110/70   01/24/18 126/66   01/04/17 98/62    Wt  Readings from Last 3 Encounters:   02/09/18 179 lb 14.4 oz (81.6 kg)   01/24/18 184 lb (83.5 kg)   01/04/17 192 lb (87.1 kg)                  Patient Active Problem List   Diagnosis     CARDIOVASCULAR SCREENING; LDL GOAL LESS THAN 160     GERD (gastroesophageal reflux disease)     Past Surgical History:   Procedure Laterality Date     COLONOSCOPY  2009     EYE SURGERY       HERNIA REPAIR, INGUINAL RT/LT  09/09/10    Bilateral     SURGICAL HISTORY OF -       great toenail removed, both feet     SURGICAL HISTORY OF -       wisdom teeth       Social History   Substance Use Topics     Smoking status: Never Smoker     Smokeless tobacco: Never Used     Alcohol use 0.0 oz/week     0 Standard drinks or equivalent per week      Comment: rare     Family History   Problem Relation Age of Onset     Thyroid Disease Mother      Macular Degeneration Mother      Asthma Father      Coronary Artery Disease Paternal Grandfather      Colon Cancer No family hx of      Breast Cancer No family hx of      Prostate Cancer No family hx of      DIABETES No family hx of      Hypertension No family hx of      Hyperlipidemia No family hx of      CEREBROVASCULAR DISEASE No family hx of          Current Outpatient Prescriptions   Medication Sig Dispense Refill     IBUPROFEN PO        No Known Allergies  Recent Labs   Lab Test  03/01/12   0843  01/27/11   1015   LDL  128  155*   HDL  39*  42   TRIG  98  146   ALT  46   --    CR  0.97   --    GFRESTIMATED  84   --    GFRESTBLACK  >90   --    POTASSIUM  4.2   --         Reviewed and updated as needed this visit by clinical staff  Tobacco  Allergies  Meds  Med Hx  Surg Hx  Fam Hx  Soc Hx        Reviewed and updated as needed this visit by Provider  Meds            Review of Systems  C: NEGATIVE for fever, chills, change in weight  I: NEGATIVE for worrisome rashes, moles or lesions  E: NEGATIVE for vision changes or irritation  ENT: NEGATIVE for ear, mouth and throat problems  R: NEGATIVE for  "significant cough or SOB  CV: NEGATIVE for chest pain, palpitations or peripheral edema  GI: NEGATIVE for nausea, abdominal pain, heartburn, or change in bowel habits   male: negative for dysuria, hematuria, decreased urinary stream, erectile dysfunction, urethral discharge  M: NEGATIVE for significant arthralgias or myalgia  N: NEGATIVE for weakness, dizziness or paresthesias  P: NEGATIVE for changes in mood or affect    OBJECTIVE:   /70 (BP Location: Left arm, Patient Position: Chair, Cuff Size: Adult Large)  Pulse 77  Temp 96.5  F (35.8  C) (Temporal)  Resp 16  Ht 6' 0.6\" (1.844 m)  Wt 179 lb 14.4 oz (81.6 kg)  SpO2 99%  BMI 24 kg/m2    Physical Exam  GENERAL: healthy, alert and no distress  EYES: Eyes grossly normal to inspection, PERRL and conjunctivae and sclerae normal  HENT: ear canals and TM's normal, nose and mouth without ulcers or lesions  NECK: no adenopathy, no asymmetry, masses, or scars and thyroid normal to palpation  RESP: lungs clear to auscultation - no rales, rhonchi or wheezes  CV: regular rate and rhythm, normal S1 S2, no S3 or S4, no murmur, click or rub, no peripheral edema and peripheral pulses strong  ABDOMEN: soft, nontender, no hepatosplenomegaly, no masses and bowel sounds normal  MS: tenderness to palpation left hip flexor, sartorius muscle and left brachioradialis.   SKIN: no suspicious lesions or rashes  NEURO: Normal strength and tone, mentation intact and speech normal  PSYCH: mentation appears normal, affect normal/bright    ASSESSMENT/PLAN:       ICD-10-CM    1. Encounter for routine adult health examination without abnormal findings Z00.00 Lipid Profile (Chol, Trig, HDL, LDL calc)     Basic metabolic panel   2. Special screening for malignant neoplasms, colon Z12.11 GASTROENTEROLOGY ADULT REF PROCEDURE ONLY [facility] [phone]; No Provider Preference   3. Tendinitis of left hip flexor M76.892 PHYSICAL THERAPY REFERRAL   4. Brachioradialis muscle " "tenderness M79.1 PHYSICAL THERAPY REFERRAL       COUNSELING:   Reviewed preventive health counseling, as reflected in patient instructions       Regular exercise       Healthy diet/nutrition       Vision screening       Colon cancer screening         reports that he has never smoked. He has never used smokeless tobacco.    Estimated body mass index is 24 kg/(m^2) as calculated from the following:    Height as of this encounter: 6' 0.6\" (1.844 m).    Weight as of this encounter: 179 lb 14.4 oz (81.6 kg).       Counseling Resources:  ATP IV Guidelines  Pooled Cohorts Equation Calculator  FRAX Risk Assessment  ICSI Preventive Guidelines  Dietary Guidelines for Americans, 2010  USDA's MyPlate  ASA Prophylaxis  Lung CA Screening    Tk Acevedo MD  [facility]  "

## 2018-02-09 NOTE — LETTER
February 13, 2018      Frandy Jerez  [address] [facility] [address]        Dear ,    We are writing to inform you of your test results.    Fasting lipid profile, renal function and fasting glucose remain stable. The current medical regimen is effective;  continue present plan and medications.       Resulted Orders   Lipid Profile (Chol, Trig, HDL, LDL calc)   Result Value Ref Range    Cholesterol 189 <200 mg/dL    Triglycerides 71 <150 mg/dL    HDL Cholesterol 58 >39 mg/dL    LDL Cholesterol Calculated 117 (H) <100 mg/dL      Comment:      Above desirable:  100-129 mg/dl  Borderline High:  130-159 mg/dL  High:             160-189 mg/dL  Very high:       >189 mg/dl      Non HDL Cholesterol 131 (H) <130 mg/dL      Comment:      Above Desirable:  130-159 mg/dl  Borderline high:  160-189 mg/dl  High:             190-219 mg/dl  Very high:       >219 mg/dl     Basic metabolic panel   Result Value Ref Range    Sodium 140 133 - 144 mmol/L    Potassium 4.1 3.4 - 5.3 mmol/L    Chloride 105 94 - 109 mmol/L    Carbon Dioxide 30 20 - 32 mmol/L    Anion Gap 5 3 - 14 mmol/L    Glucose 86 70 - 99 mg/dL    Urea Nitrogen 16 7 - 30 mg/dL    Creatinine 0.96 0.66 - 1.25 mg/dL    GFR Estimate 83 >60 mL/min/1.7m2      Comment:      Non  GFR Calc    GFR Estimate If Black >90 >60 mL/min/1.7m2      Comment:       GFR Calc    Calcium 8.7 8.5 - 10.1 mg/dL       If you have any questions or concerns, please call the clinic at the number listed above.       Sincerely,        Tk Acevedo MD

## 2018-02-09 NOTE — LETTER

## 2018-02-11 ENCOUNTER — HEALTH MAINTENANCE LETTER (OUTPATIENT)
Age: 53
End: 2018-02-11

## 2018-02-12 ENCOUNTER — TELEPHONE (OUTPATIENT)
Dept: FAMILY MEDICINE | Facility: OTHER | Age: 53
End: 2018-02-12

## 2018-02-12 NOTE — TELEPHONE ENCOUNTER
Called to schedule colonoscopy, patient states he is not ready and will call back another time    Schedule w ABDULKADIR or henrry

## 2018-05-02 ENCOUNTER — HOSPITAL ENCOUNTER (OUTPATIENT)
Dept: PHYSICAL THERAPY | Facility: CLINIC | Age: 53
Setting detail: THERAPIES SERIES
End: 2018-05-02
Attending: FAMILY MEDICINE
Payer: COMMERCIAL

## 2018-05-02 PROCEDURE — 97530 THERAPEUTIC ACTIVITIES: CPT | Mod: GP

## 2018-05-02 PROCEDURE — 40000718 ZZHC STATISTIC PT DEPARTMENT ORTHO VISIT

## 2018-05-02 PROCEDURE — 97161 PT EVAL LOW COMPLEX 20 MIN: CPT | Mod: GP

## 2018-05-08 ENCOUNTER — HOSPITAL ENCOUNTER (OUTPATIENT)
Dept: PHYSICAL THERAPY | Facility: CLINIC | Age: 53
Setting detail: THERAPIES SERIES
End: 2018-05-08
Attending: FAMILY MEDICINE
Payer: COMMERCIAL

## 2018-05-08 PROCEDURE — 40000718 ZZHC STATISTIC PT DEPARTMENT ORTHO VISIT

## 2018-05-08 PROCEDURE — 97110 THERAPEUTIC EXERCISES: CPT | Mod: GP

## 2018-05-08 PROCEDURE — 97140 MANUAL THERAPY 1/> REGIONS: CPT | Mod: GP

## 2018-05-14 ENCOUNTER — HOSPITAL ENCOUNTER (OUTPATIENT)
Dept: PHYSICAL THERAPY | Facility: CLINIC | Age: 53
Setting detail: THERAPIES SERIES
End: 2018-05-14
Attending: FAMILY MEDICINE
Payer: COMMERCIAL

## 2018-05-14 PROCEDURE — 40000718 ZZHC STATISTIC PT DEPARTMENT ORTHO VISIT

## 2018-05-14 PROCEDURE — 97110 THERAPEUTIC EXERCISES: CPT | Mod: GP

## 2018-05-30 ENCOUNTER — HOSPITAL ENCOUNTER (OUTPATIENT)
Dept: PHYSICAL THERAPY | Facility: CLINIC | Age: 53
Setting detail: THERAPIES SERIES
End: 2018-05-30
Attending: FAMILY MEDICINE
Payer: COMMERCIAL

## 2018-05-30 PROCEDURE — 97110 THERAPEUTIC EXERCISES: CPT | Mod: GP

## 2018-05-30 PROCEDURE — 40000718 ZZHC STATISTIC PT DEPARTMENT ORTHO VISIT

## 2018-06-13 ENCOUNTER — HOSPITAL ENCOUNTER (OUTPATIENT)
Dept: PHYSICAL THERAPY | Facility: CLINIC | Age: 53
Setting detail: THERAPIES SERIES
End: 2018-06-13
Attending: FAMILY MEDICINE
Payer: COMMERCIAL

## 2018-06-13 PROCEDURE — 97530 THERAPEUTIC ACTIVITIES: CPT | Mod: GP

## 2018-06-13 PROCEDURE — 40000718 ZZHC STATISTIC PT DEPARTMENT ORTHO VISIT

## 2018-06-13 PROCEDURE — 97110 THERAPEUTIC EXERCISES: CPT | Mod: GP

## 2018-06-13 NOTE — PROGRESS NOTES
Outpatient Physical Therapy Discharge Note     Patient: Frandy Jerez  : 1965    Beginning/End Dates of Reporting Period:  2018 to 2018    Referring Provider: Dr. Tk Acevedo    Therapy Diagnosis: hip pain and dysfunction with signs and symptoms consistent with possible iliopsoas tendinopathy, possible GT bursitis, and possible labral involvement     Client Self Report: Pt states that he has had some low back pain over the past week. He notes that this has some improved pain with this with his exericses    Objective Measurements:  Objective Measure: LEFS  Details: Lower Extremity Functional Scale (LEFS) assesses the patients level of difficulty with various activities. The higher the score, the greater the level of function a patient demonstrates. Pt scored 68 points out of 80 possible indicating patient is at 85% of maximal function. MCID is 9 points.     Objective Measure: Pain  Details: Pt is experiencing some pain in low back near L SI region.      Goals:  Goal Identifier 1   Goal Description Pt will report full participation in workouts and floor hockey games with pain levels of 3/10 or less for improved safety with participation with sports (Pt notes only 1-2/10 if any pain.)   Target Date 18   Date Met  18   Progress: Goal met     Goal Identifier 2   Goal Description Pt will have 5/5 strength in hip flexion and abduction without pain for improved safety with participation in sports (Pain in back, not in hip. 5/5)   Target Date 18   Date Met      Progress: Progressed strength, but is now having some pain in low back with resisted motions.     Goal Identifier 3   Goal Description Pt will have full (equal to opposing extremity) AROM of hip without increased pain for improved safety getting into and out of low car. (Hip flexion equal no pain, R ER very limited, painful)   Target Date 18   Date Met      Progress: Continues to demonstrate restricted motions in R hip with  AARON assessment with deep lateral joint pain     Progress Toward Goals:   Progress this reporting period: Pt has demonstrated improvement in pain management, strength, and overall improved function. He has been able to return to playing floor hockey without any exacerbations of pain. He has progressed with core strength and hip strength. Continues to have limited ROM on R hip with AARON assessment with deep joint pain.     Plan:  Other: discussed to be held with physician regarding continued hip pain.     Discharge:  No    UPDATE:  Pt has not been seen since 6/13/2018 due to overall improved hip flexor pain. Per chart review, did have follow up with Dr. Layne for further exam on different hip pain. Will discharge this episode of care at this time.    Plan:  Discharge from therapy.    Discharge:    Reason for Discharge: Patient has met all goals or will continue to progress toward goals through continued HEP.    Equipment Issued: N/A    Discharge Plan: Patient to continue home program.

## 2018-07-18 ENCOUNTER — TELEPHONE (OUTPATIENT)
Dept: FAMILY MEDICINE | Facility: OTHER | Age: 53
End: 2018-07-18

## 2018-07-18 DIAGNOSIS — M25.552 HIP PAIN, LEFT: Primary | ICD-10-CM

## 2018-07-18 NOTE — TELEPHONE ENCOUNTER
Patient has seen physical therapy and still experiencing pain in his hip. He would like to see an orthopedist to see what next steps would be. Referral has been started, needs Dx and Signature.  Patient will call for scheduling once order is placed.    Komal Sol XRO/  [facility]

## 2018-07-20 ENCOUNTER — OFFICE VISIT (OUTPATIENT)
Dept: ORTHOPEDICS | Facility: CLINIC | Age: 53
End: 2018-07-20
Payer: COMMERCIAL

## 2018-07-20 ENCOUNTER — RADIANT APPOINTMENT (OUTPATIENT)
Dept: GENERAL RADIOLOGY | Facility: CLINIC | Age: 53
End: 2018-07-20
Attending: PHYSICAL MEDICINE & REHABILITATION
Payer: COMMERCIAL

## 2018-07-20 VITALS
SYSTOLIC BLOOD PRESSURE: 123 MMHG | HEIGHT: 73 IN | WEIGHT: 178 LBS | DIASTOLIC BLOOD PRESSURE: 74 MMHG | BODY MASS INDEX: 23.59 KG/M2

## 2018-07-20 DIAGNOSIS — M70.62 GREATER TROCHANTERIC BURSITIS OF LEFT HIP: ICD-10-CM

## 2018-07-20 DIAGNOSIS — M25.552 LEFT HIP PAIN: Primary | ICD-10-CM

## 2018-07-20 DIAGNOSIS — M25.552 LEFT HIP PAIN: ICD-10-CM

## 2018-07-20 DIAGNOSIS — M24.852 SNAPPING HIP, LEFT: ICD-10-CM

## 2018-07-20 PROCEDURE — 99244 OFF/OP CNSLTJ NEW/EST MOD 40: CPT | Performed by: PHYSICAL MEDICINE & REHABILITATION

## 2018-07-20 PROCEDURE — 73502 X-RAY EXAM HIP UNI 2-3 VIEWS: CPT | Mod: TC

## 2018-07-20 RX ORDER — MELOXICAM 15 MG/1
15 TABLET ORAL DAILY
Qty: 30 TABLET | Refills: 1 | Status: SHIPPED | OUTPATIENT
Start: 2018-07-20 | End: 2019-10-11

## 2018-07-20 NOTE — PROGRESS NOTES
Sports Medicine Clinic Visit    PCP: Tk Acevedo    CC: Patient presents with:  Left Hip - Pain      HPI:  Frandy Jerez is a 52 year old male who is seen in consultation at the request of Tk Acevedo M.D..   He notes left hip pain that began 2 years ago when he flexes the leg at the hip joint he would have pain in the anterior hip. Her reports his PCP sent him to physical therapy and pain improved slightly, but since his hip pain has moved to the lateral and posterior hip. He reports the new pain has been present for 1-2 month.  He continues to complete his Home Exercises Program provided by his PT with minimal relief in pain   He rates the pain at a 8/10 at its worst and a 0/10 currently.  Symptoms are relieved with Home Exercises program for temporary relief. Symptoms are worsened by external rotation of the hip. He endorses popping, grinding and weakness.   He denies swelling, bruising, catching, locking, instability, numbness and tingling.  Other treatment has included cold compresses, ibuprofen, rest and physical therapy.  He notes difficulty with running, sports, getting out of car.       Review of Systems:  Musculoskeletal: as above  Remainder of review of systems is negative including constitutional, eyes, ENT, CV, pulmonary, GI, , endocrine, skin, hematologic, and neurologic except as noted in HPI or medical history.    History reviewed. No pertinent past surgical/medical/family/social history other than as mentioned in HPI.    Patient Active Problem List   Diagnosis     CARDIOVASCULAR SCREENING; LDL GOAL LESS THAN 160     GERD (gastroesophageal reflux disease)     No past medical history on file.  Past Surgical History:   Procedure Laterality Date     COLONOSCOPY  2009     EYE SURGERY       HERNIA REPAIR, INGUINAL RT/LT  09/09/10    Bilateral     SURGICAL HISTORY OF -       great toenail removed, both feet     SURGICAL HISTORY OF -       wisdom teeth     Family History   Problem Relation Age of  "Onset     Thyroid Disease Mother      Macular Degeneration Mother      Asthma Father      Coronary Artery Disease Paternal Grandfather      Colon Cancer No family hx of      Breast Cancer No family hx of      Prostate Cancer No family hx of      Diabetes No family hx of      Hypertension No family hx of      Hyperlipidemia No family hx of      Cerebrovascular Disease No family hx of      Social History     Social History     Marital status:      Spouse name: Nikkie     Number of children: 3     Years of education: N/A     Occupational History      [employer]          Social History Main Topics     Smoking status: Never Smoker     Smokeless tobacco: Never Used     Alcohol use 0.0 oz/week     0 Standard drinks or equivalent per week      Comment: rare     Drug use: No     Sexual activity: Yes     Partners: Female     Birth control/ protection: Condom     Other Topics Concern     Parent/Sibling W/ Cabg, Mi Or Angioplasty Before 65f 55m? 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      No Known Allergies      Objective:  /74 (BP Location: Left arm, Patient Position: Chair, Cuff Size: Adult Regular)  Ht 6' 0.5\" (1.842 m)  Wt 178 lb (80.7 kg)  BMI 23.81 kg/m2    General: Alert and in no distress    Head: Normocephalic, atraumatic  Eyes: no scleral icterus or conjunctival erythema   Oropharynx:  Mucous membranes moist  Skin: no erythema, petechiae, or jaundice  CV: regular rhythm by palpation, 2+ distal pulses  Resp: normal respiratory effort without conversational dyspnea   Psych: normal mood and affect    Gait: Non-antalgic, appropriate coordination and balance   Neuro: Motor strength and sensation as noted " below    Musculoskeletal:    Bilateral hip exam    Inspection:      no edema or ecchymosis in hip area    Tender:      none bilaterally    ROM: Mildly decreased left external rotation.  Snapping left hip with extending the left leg while supine.       Strength: 5/5 hip extension/flexion/abduction/adduction, knee extension/flexion, ankle dorsiflexion/plantarflexion, great toe extension, toe flexion    Sensation: grossly intact to light touch in the lower extremities bilaterally    Special Tests:      positive (+) AARON bilaterally       neg (-) FADIR bilaterally    Radiology:  Independent visualization of images performed and reviewed with Frandy.    Recent Results (from the past 744 hour(s))   XR Pelvis w Hip Left 1 View    Narrative    PELVIS AND LEFT HIP ONE VIEW   7/20/2018 11:02 AM     HISTORY:  Left hip pain.    COMPARISON: 8/9/2012    FINDINGS: Surgical coils. Incidentally noted L5 spina bifida occulta.  Mild symphysis pubis degenerative change. Degenerative disc disease at  L4-L5.      Impression    IMPRESSION:   1. Minimal left hip osteoarthritis, similar to the previous exam.  2. There is bony convexity at the lateral femoral head-neck junction  regions bilaterally.  This can predispose to femoroacetabular  impingement, and clinical correlation is recommended.         Assessment:  1. Left hip pain    2. Greater trochanteric bursitis of left hip    3. Snapping hip, left        Plan:  Discussed the assessment with the patient and developed a plan together:  -Based on Dr. Acevedo' note and Frandy's description of his pain, he seemed to be having hip flexor pain prior to starting physical therapy.  This has since improved.  Pain now is more lateral and posterior suggesting greater trochanteric bursitis and muscle tightness/pain.  Labral pathology and lumbar spine etiology are also on the differential.  He does have a snapping left hip but I do not think this is the primary cause of his pain.    -Meloxicam 15 mg  prescribed daily.  Please take this medication with food.  DO NOT take any other nonsteroidal anti-inflammatory drugs (NSAIDs) such as Advil, ibuprofen, Aleve, naproxen, etc while on this medication.  -Tizanidine 4 mg every 8 hours as needed for muscle spasms/pain.  Can cause sedation.  Do not take prior to driving.  -Ice or heat 15-20 minutes as needed (Avoid sleeping on a heating pad or ice)  -Patient's preferred over the counter medications as directed on packaging as needed for pain or soreness.  Please take ibuprofen with food. Do not premedicate prior to activity.  -Over the counter lidocaine cream as needed (i.e. Aspercreme or generic equivalent)  -Continue home exercises. Please do 5-6 days of exercises per week.  -Avoid aggravating activities.  -We also discussed a greater trochanteric bursa steroid injection (elected to proceed with medications first), MRI of the hip, and further evaluation into the back    -Follow up as needed if symptoms fail to improve or worsen.  Please call with questions or concerns.        Autumn Layne MD, [facility] Medicine  [facility]

## 2018-07-20 NOTE — MR AVS SNAPSHOT
After Visit Summary   7/20/2018    Frandy Jerez    MRN: [id number]           Patient Information     Date Of Birth          1965        Visit Information        Provider Department      7/20/2018 10:40 AM Anneliese Layne MD [facility]        Today's Diagnoses     Left hip pain    -  1    Greater trochanteric bursitis of left hip          Care Instructions    -Meloxicam 15 mg prescribed daily.  Please take this medication with food.  DO NOT take any other nonsteroidal anti-inflammatory drugs (NSAIDs) such as Advil, ibuprofen, Aleve, naproxen, etc while on this medication.  -Tizanidine 4 mg every 8 hours as needed for muscle spasms/pain.  Can cause sedation.  Do not take prior to driving.  -Ice or heat 15-20 minutes as needed (Avoid sleeping on a heating pad or ice)  -Patient's preferred over the counter medications as directed on packaging as needed for pain or soreness.  Please take ibuprofen with food. Do not premedicate prior to activity.  -Over the counter lidocaine cream as needed (i.e. Aspercreme or generic equivalent)  -Continue home exercises. Please do 5-6 days of exercises per week.  -Avoid aggravating activities.  -We also discussed a greater trochanteric bursa steroid injection, MRI of the hip, and further evaluation into the back    -Follow up as needed if symptoms fail to improve or worsen.  Please call with questions or concerns.                Follow-ups after your visit        Who to contact     If you have questions or need follow up information about today's clinic visit or your schedule please contact [facility] directly at [phone].  Normal or non-critical lab and imaging results will be communicated to you by MyChart, letter or phone within 4 business days after the clinic has received the results. If you do not hear from us within 7 days, please contact the clinic through MyChart or phone. If you have a critical or abnormal lab  "result, we will notify you by phone as soon as possible.  Submit refill requests through [employer] or call your pharmacy and they will forward the refill request to us. Please allow 3 business days for your refill to be completed.          Additional Information About Your Visit        Care EveryWhere ID     This is your Care EveryWhere ID. This could be used by other organizations to access your [place] medical records  [id number]        Your Vitals Were     Height BMI (Body Mass Index)                6' 0.5\" (1.842 m) 23.81 kg/m2           Blood Pressure from Last 3 Encounters:   07/20/18 123/74   02/09/18 110/70   01/24/18 126/66    Weight from Last 3 Encounters:   07/20/18 178 lb (80.7 kg)   02/09/18 179 lb 14.4 oz (81.6 kg)   01/24/18 184 lb (83.5 kg)              Today, you had the following     No orders found for display         Today's Medication Changes          These changes are accurate as of 7/20/18 11:21 AM.  If you have any questions, ask your nurse or doctor.               Start taking these medicines.        Dose/Directions    meloxicam 15 MG tablet   Commonly known as:  MOBIC   Used for:  Greater trochanteric bursitis of left hip, Left hip pain   Started by:  Anneliese Layne MD        Dose:  15 mg   Take 1 tablet (15 mg) by mouth daily   Quantity:  30 tablet   Refills:  1       tiZANidine 4 MG tablet   Commonly known as:  ZANAFLEX   Used for:  Greater trochanteric bursitis of left hip, Left hip pain   Started by:  Anneliese Layne MD        Dose:  4 mg   Take 1 tablet (4 mg) by mouth 3 times daily as needed for muscle spasms   Quantity:  90 tablet   Refills:  1            Where to get your medicines      These medications were sent to [facility] - [place], [place] - [address]  [address], [facility] [address]     Phone:  [phone]     meloxicam 15 MG tablet    tiZANidine 4 MG tablet                Primary Care Provider Office Phone # Fax #    Tk Acevedo MD " [phone]       [address] [facility] [address]        Equal Access to Services     RAFAL MARTINEZ : Hadii aad ku hadmagaliolya Huyen, wamadysonda luqadaha, qaybta kasarahda mehdipatricia, lana onelin hayaashayla hardinggoyo sukaterina matthew covarrubias. So [facility] [phone].    ATENCIÓN: Si habla español, tiene a walls disposición servicios gratuitos de asistencia lingüística. Llame al [phone].    We comply with applicable federal civil rights laws and [place] laws. We do not discriminate on the basis of race, color, national origin, age, disability, sex, sexual orientation, or gender identity.            Thank you!     Thank you for choosing [facility]  for your care. Our goal is always to provide you with excellent care. Hearing back from our patients is one way we can continue to improve our services. Please take a few minutes to complete the written survey that you may receive in the mail after your visit with us. Thank you!             Your Updated Medication List - Protect others around you: Learn how to safely use, store and throw away your medicines at www.disposemymeds.org.          This list is accurate as of 7/20/18 11:21 AM.  Always use your most recent med list.                   Brand Name Dispense Instructions for use Diagnosis    IBUPROFEN PO           meloxicam 15 MG tablet    MOBIC    30 tablet    Take 1 tablet (15 mg) by mouth daily    Greater trochanteric bursitis of left hip, Left hip pain       tiZANidine 4 MG tablet    ZANAFLEX    90 tablet    Take 1 tablet (4 mg) by mouth 3 times daily as needed for muscle spasms    Greater trochanteric bursitis of left hip, Left hip pain

## 2018-07-20 NOTE — LETTER
7/20/2018         RE: Frandy Jerez  [address]  [facility] [address]        Dear Colleague,    Thank you for referring your patient, Frandy Jerez, to the [facility]. Please see a copy of my visit note below.    Sports Medicine Clinic Visit    PCP: Tk Acevedo    CC: Patient presents with:  Left Hip - Pain      HPI:  Frandy Jerez is a 52 year old male who is seen in consultation at the request of Tk Acevedo M.D..   He notes left hip pain that began 2 years ago when he flexes the leg at the hip joint he would have pain in the anterior hip. Her reports his PCP sent him to physical therapy and pain improved slightly, but since his hip pain has moved to the lateral and posterior hip. He reports the new pain has been present for 1-2 month.  He continues to complete his Home Exercises Program provided by his PT with minimal relief in pain   He rates the pain at a 8/10 at its worst and a 0/10 currently.  Symptoms are relieved with Home Exercises program for temporary relief. Symptoms are worsened by external rotation of the hip. He endorses popping, grinding and weakness.   He denies swelling, bruising, catching, locking, instability, numbness and tingling.  Other treatment has included cold compresses, ibuprofen, rest and physical therapy.  He notes difficulty with running, sports, getting out of car.       Review of Systems:  Musculoskeletal: as above  Remainder of review of systems is negative including constitutional, eyes, ENT, CV, pulmonary, GI, , endocrine, skin, hematologic, and neurologic except as noted in HPI or medical history.    History reviewed. No pertinent past surgical/medical/family/social history other than as mentioned in HPI.    Patient Active Problem List   Diagnosis     CARDIOVASCULAR SCREENING; LDL GOAL LESS THAN 160     GERD (gastroesophageal reflux disease)     No past medical history on file.  Past Surgical History:   Procedure Laterality Date     COLONOSCOPY   "2009     EYE SURGERY       HERNIA REPAIR, INGUINAL RT/LT  09/09/10    Bilateral     SURGICAL HISTORY OF -       great toenail removed, both feet     SURGICAL HISTORY OF -       wisdom teeth     Family History   Problem Relation Age of Onset     Thyroid Disease Mother      Macular Degeneration Mother      Asthma Father      Coronary Artery Disease Paternal Grandfather      Colon Cancer No family hx of      Breast Cancer No family hx of      Prostate Cancer No family hx of      Diabetes No family hx of      Hypertension No family hx of      Hyperlipidemia No family hx of      Cerebrovascular Disease No family hx of      Social History     Social History     Marital status:      Spouse name: Nikkie     Number of children: 3     Years of education: N/A     Occupational History      [employer]          Social History Main Topics     Smoking status: Never Smoker     Smokeless tobacco: Never Used     Alcohol use 0.0 oz/week     0 Standard drinks or equivalent per week      Comment: rare     Drug use: No     Sexual activity: Yes     Partners: Female     Birth control/ protection: Condom     Other Topics Concern     Parent/Sibling W/ Cabg, Mi Or Angioplasty Before 65f 55m? 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      No Known Allergies      Objective:  /74 (BP Location: Left arm, Patient Position: Chair, Cuff Size: Adult Regular)  Ht 6' 0.5\" (1.842 m)  Wt 178 lb (80.7 kg)  BMI 23.81 kg/m2    General: Alert and in no distress    Head: Normocephalic, atraumatic  Eyes: no scleral icterus or conjunctival erythema   Oropharynx:  Mucous membranes moist  Skin: no erythema, petechiae, or jaundice  CV: regular rhythm by palpation, 2+ " distal pulses  Resp: normal respiratory effort without conversational dyspnea   Psych: normal mood and affect    Gait: Non-antalgic, appropriate coordination and balance   Neuro: Motor strength and sensation as noted below    Musculoskeletal:    Bilateral hip exam    Inspection:      no edema or ecchymosis in hip area    Tender:      none bilaterally    ROM: Mildly decreased left external rotation.  Snapping left hip with extending the left leg while supine.       Strength: 5/5 hip extension/flexion/abduction/adduction, knee extension/flexion, ankle dorsiflexion/plantarflexion, great toe extension, toe flexion    Sensation: grossly intact to light touch in the lower extremities bilaterally    Special Tests:      positive (+) AARON bilaterally       neg (-) FADIR bilaterally    Radiology:  Independent visualization of images performed and reviewed with Frandy.    Recent Results (from the past 744 hour(s))   XR Pelvis w Hip Left 1 View    Narrative    PELVIS AND LEFT HIP ONE VIEW   7/20/2018 11:02 AM     HISTORY:  Left hip pain.    COMPARISON: 8/9/2012    FINDINGS: Surgical coils. Incidentally noted L5 spina bifida occulta.  Mild symphysis pubis degenerative change. Degenerative disc disease at  L4-L5.      Impression    IMPRESSION:   1. Minimal left hip osteoarthritis, similar to the previous exam.  2. There is bony convexity at the lateral femoral head-neck junction  regions bilaterally.  This can predispose to femoroacetabular  impingement, and clinical correlation is recommended.         Assessment:  1. Left hip pain    2. Greater trochanteric bursitis of left hip    3. Snapping hip, left        Plan:  Discussed the assessment with the patient and developed a plan together:  -Based on Dr. Acevedo' note and Frandy's description of his pain, he seemed to be having hip flexor pain prior to starting physical therapy.  This has since improved.  Pain now is more lateral and posterior suggesting greater trochanteric  bursitis and muscle tightness/pain.  Labral pathology and lumbar spine etiology are also on the differential.  He does have a snapping left hip but I do not think this is the primary cause of his pain.    -Meloxicam 15 mg prescribed daily.  Please take this medication with food.  DO NOT take any other nonsteroidal anti-inflammatory drugs (NSAIDs) such as Advil, ibuprofen, Aleve, naproxen, etc while on this medication.  -Tizanidine 4 mg every 8 hours as needed for muscle spasms/pain.  Can cause sedation.  Do not take prior to driving.  -Ice or heat 15-20 minutes as needed (Avoid sleeping on a heating pad or ice)  -Patient's preferred over the counter medications as directed on packaging as needed for pain or soreness.  Please take ibuprofen with food. Do not premedicate prior to activity.  -Over the counter lidocaine cream as needed (i.e. Aspercreme or generic equivalent)  -Continue home exercises. Please do 5-6 days of exercises per week.  -Avoid aggravating activities.  -We also discussed a greater trochanteric bursa steroid injection (elected to proceed with medications first), MRI of the hip, and further evaluation into the back    -Follow up as needed if symptoms fail to improve or worsen.  Please call with questions or concerns.        Autumn Layne MD, [facility] Sports Medicine  [facility]      Again, thank you for allowing me to participate in the care of your patient.        Sincerely,        Anneliese Layne MD

## 2018-07-20 NOTE — PATIENT INSTRUCTIONS
-Meloxicam 15 mg prescribed daily.  Please take this medication with food.  DO NOT take any other nonsteroidal anti-inflammatory drugs (NSAIDs) such as Advil, ibuprofen, Aleve, naproxen, etc while on this medication.  -Tizanidine 4 mg every 8 hours as needed for muscle spasms/pain.  Can cause sedation.  Do not take prior to driving.  -Ice or heat 15-20 minutes as needed (Avoid sleeping on a heating pad or ice)  -Patient's preferred over the counter medications as directed on packaging as needed for pain or soreness.  Please take ibuprofen with food. Do not premedicate prior to activity.  -Over the counter lidocaine cream as needed (i.e. Aspercreme or generic equivalent)  -Continue home exercises. Please do 5-6 days of exercises per week.  -Avoid aggravating activities.  -We also discussed a greater trochanteric bursa steroid injection, MRI of the hip, and further evaluation into the back    -Follow up as needed if symptoms fail to improve or worsen.  Please call with questions or concerns.

## 2018-08-22 NOTE — ADDENDUM NOTE
Encounter addended by: Karen Contreras, PT on: 8/22/2018  7:33 AM<BR>     Actions taken: Sign clinical note, Episode resolved

## 2019-10-07 NOTE — PROGRESS NOTES
SUBJECTIVE:   CC: Frandy Jerez is an 54 year old male who presents for preventative health visit.     Healthy Habits:     Getting at least 3 servings of Calcium per day:  Yes    Bi-annual eye exam:  Yes    Dental care twice a year:  Yes    Sleep apnea or symptoms of sleep apnea:  None    Diet:  Regular (no restrictions)    Frequency of exercise:  4-5 days/week    Duration of exercise:  45-60 minutes    Taking medications regularly:  Not Applicable    Medication side effects:  Not applicable    PHQ-2 Total Score: 0    Additional concerns today:  No    About 2 weeks ago, was doing some lunges.  Got sore in his abdomen the next day.  Continued for several days.  Thinks he strained something, so has been taking meloxicam and tizanidine to help with this.  Otherwise, hasn't taken it for about a week.        Today's PHQ-2 Score:   PHQ-2 ( 1999 Pfizer) 10/11/2019   Q1: Little interest or pleasure in doing things 0   Q2: Feeling down, depressed or hopeless 0   PHQ-2 Score 0   Q1: Little interest or pleasure in doing things Not at all   Q2: Feeling down, depressed or hopeless Not at all   PHQ-2 Score 0       Abuse: Current or Past(Physical, Sexual or Emotional)- No  Do you feel safe in your environment? Yes    Social History     Tobacco Use     Smoking status: Never Smoker     Smokeless tobacco: Never Used   Substance Use Topics     Alcohol use: Yes     Alcohol/week: 0.0 standard drinks     Comment: rare     If you drink alcohol do you typically have >3 drinks per day or >7 drinks per week? No    Alcohol Use 10/11/2019   Prescreen: >3 drinks/day or >7 drinks/week? No   Prescreen: >3 drinks/day or >7 drinks/week? -   No flowsheet data found.    Last PSA: No results found for: PSA    Reviewed orders with patient. Reviewed health maintenance and updated orders accordingly - Yes  BP Readings from Last 3 Encounters:   10/11/19 112/70   07/20/18 123/74   02/09/18 110/70    Wt Readings from Last 3 Encounters:   10/11/19 85.3 kg (188  lb)   07/20/18 80.7 kg (178 lb)   02/09/18 81.6 kg (179 lb 14.4 oz)                  Patient Active Problem List   Diagnosis     CARDIOVASCULAR SCREENING; LDL GOAL LESS THAN 160     GERD (gastroesophageal reflux disease)     Past Surgical History:   Procedure Laterality Date     COLONOSCOPY  2009     EYE SURGERY       HERNIA REPAIR, INGUINAL RT/LT  09/09/10    Bilateral     SURGICAL HISTORY OF -       great toenail removed, both feet     SURGICAL HISTORY OF -       wisdom teeth       Social History     Tobacco Use     Smoking status: Never Smoker     Smokeless tobacco: Never Used   Substance Use Topics     Alcohol use: Yes     Alcohol/week: 0.0 standard drinks     Comment: rare     Family History   Problem Relation Age of Onset     Thyroid Disease Mother      Macular Degeneration Mother      Asthma Father      Coronary Artery Disease Paternal Grandfather      Colon Cancer No family hx of      Breast Cancer No family hx of      Prostate Cancer No family hx of      Diabetes No family hx of      Hypertension No family hx of      Hyperlipidemia No family hx of      Cerebrovascular Disease No family hx of          Current Outpatient Medications   Medication Sig Dispense Refill     IBUPROFEN PO        meloxicam (MOBIC) 15 MG tablet Take 1 tablet (15 mg) by mouth daily 30 tablet 1     tiZANidine (ZANAFLEX) 4 MG tablet Take 1 tablet (4 mg) by mouth 3 times daily as needed for muscle spasms 90 tablet 1     No Known Allergies  Recent Labs   Lab Test 02/09/18  1034 03/01/12  0843   * 128   HDL 58 39*   TRIG 71 98   ALT  --  46   CR 0.96 0.97   GFRESTIMATED 83 84   GFRESTBLACK >90 >90   POTASSIUM 4.1 4.2        Reviewed and updated as needed this visit by clinical staff  Tobacco  Allergies  Med Hx  Surg Hx  Fam Hx  Soc Hx        Reviewed and updated as needed this visit by Provider            Review of Systems   Constitutional: Negative for chills and fever.   HENT: Positive for congestion. Negative for ear pain,  "hearing loss and sore throat.    Eyes: Negative for pain and visual disturbance.   Respiratory: Positive for cough. Negative for shortness of breath.    Cardiovascular: Negative for chest pain, palpitations and peripheral edema.   Gastrointestinal: Positive for abdominal pain. Negative for constipation, diarrhea, heartburn, hematochezia and nausea.   Genitourinary: Negative for dysuria, frequency, genital sores, hematuria and urgency.   Musculoskeletal: Negative for arthralgias, joint swelling and myalgias.   Skin: Negative for rash.   Neurological: Negative for dizziness, weakness, headaches and paresthesias.   Psychiatric/Behavioral: Negative for mood changes. The patient is not nervous/anxious.      Current \"head cold\".  Getting better.     OBJECTIVE:   /70   Pulse 54   Temp 97.1  F (36.2  C) (Temporal)   Resp 16   Ht 1.84 m (6' 0.44\")   Wt 85.3 kg (188 lb)   SpO2 99%   BMI 25.19 kg/m      Physical Exam  GENERAL: healthy, alert and no distress  EYES: Eyes grossly normal to inspection, PERRL and conjunctivae and sclerae normal  HENT: ear canals and TM's normal, nose and mouth without ulcers or lesions  NECK: no adenopathy, no asymmetry, masses, or scars and thyroid normal to palpation  RESP: lungs clear to auscultation - no rales, rhonchi or wheezes  CV: regular rate and rhythm, normal S1 S2, no S3 or S4, no murmur, click or rub, no peripheral edema and peripheral pulses strong  ABDOMEN: soft, nontender, no hepatosplenomegaly, no masses and bowel sounds normal  MS: no gross musculoskeletal defects noted, no edema  SKIN: no suspicious lesions or rashes  NEURO: Normal strength and tone, mentation intact and speech normal  PSYCH: mentation appears normal, affect normal/bright    Diagnostic Test Results:  Labs reviewed in Epic  Results for orders placed or performed in visit on 07/20/18   XR Pelvis w Hip Left 1 View    Narrative    PELVIS AND LEFT HIP ONE VIEW   7/20/2018 11:02 AM     HISTORY:  Left hip " pain.    COMPARISON: 8/9/2012    FINDINGS: Surgical coils. Incidentally noted L5 spina bifida occulta.  Mild symphysis pubis degenerative change. Degenerative disc disease at  L4-L5.      Impression    IMPRESSION:   1. Minimal left hip osteoarthritis, similar to the previous exam.  2. There is bony convexity at the lateral femoral head-neck junction  regions bilaterally.  This can predispose to femoroacetabular  impingement, and clinical correlation is recommended.    ALICIA KANG MD       ASSESSMENT/PLAN:       ICD-10-CM    1. Routine general medical examination at a health care facility Z00.00 GASTROENTEROLOGY ADULT REF PROCEDURE ONLY [facility] [phone]     CANCELED: C RIV4 (FLUBLOK) VACCINE RECOMBINANT DNA PRSRV ANTIBIO FREE, IM [[id number]]   2. Greater trochanteric bursitis of left hip M70.62 meloxicam (MOBIC) 15 MG tablet     tiZANidine (ZANAFLEX) 4 MG tablet   3. Left hip pain M25.552 meloxicam (MOBIC) 15 MG tablet     tiZANidine (ZANAFLEX) 4 MG tablet   4. Colon cancer screening Z12.11 GASTROENTEROLOGY ADULT REF PROCEDURE ONLY [facility] [phone]     1.  Reviewed recommended screenings and ordered appropriate testing for pt's risks and per pt's request(s).   2, 3.  Symptoms are currently controlled.  Also appears to have some very mild osteoarthritis of his left hip.  Will continue current regimen for now.  Consider further interventions if this does not have adequate control of his symptoms moving forward.  4.  Colonoscopy ordered.    Portions of this note were completed using Dragon dictation software.  Although reviewed, there may be typographical and other inadvertent errors that remain.           COUNSELING:   Reviewed preventive health counseling, as reflected in patient instructions       Regular exercise       Healthy diet/nutrition       Immunizations    Vaccinated for: Influenza             Colon cancer screening       Prostate cancer screening       Osteoporosis  "Prevention/Bone Health       The 10-year ASCVD risk score (Lorena ORTIZ Jr., et al., 2013) is: 2.7%    Values used to calculate the score:      Age: 54 years      Sex: Male      Is Non- : No      Diabetic: No      Tobacco smoker: No      Systolic Blood Pressure: 112 mmHg      Is BP treated: No      HDL Cholesterol: 58 mg/dL      Total Cholesterol: 189 mg/dL    Estimated body mass index is 25.19 kg/m  as calculated from the following:    Height as of this encounter: 1.84 m (6' 0.44\").    Weight as of this encounter: 85.3 kg (188 lb).          reports that he has never smoked. He has never used smokeless tobacco.      Counseling Resources:  ATP IV Guidelines  Pooled Cohorts Equation Calculator  FRAX Risk Assessment  ICSI Preventive Guidelines  Dietary Guidelines for Americans, 2010  USDA's MyPlate  ASA Prophylaxis  Lung CA Screening    Benjamín Palacios MD, MD  [facility]  "

## 2019-10-07 NOTE — PATIENT INSTRUCTIONS
Please get us a copy of your biometric labs.    Get your colonoscopy done.    I do recommend influenza vaccination.    Contact us or return if questions or concerns.     Have a nice day!    Dr. Palacios       Preventive Health Recommendations  Male Ages 50 - 64    Yearly exam:             See your health care provider every year in order to  o   Review health changes.   o   Discuss preventive care.    o   Review your medicines if your doctor has prescribed any.     Have a cholesterol test every 5 years, or more frequently if you are at risk for high cholesterol/heart disease.     Have a diabetes test (fasting glucose) every three years. If you are at risk for diabetes, you should have this test more often.     Have a colonoscopy at age 50, or have a yearly FIT test (stool test). These exams will check for colon cancer.      Talk with your health care provider about whether or not a prostate cancer screening test (PSA) is right for you.    You should be tested each year for STDs (sexually transmitted diseases), if you re at risk.     Shots: Get a flu shot each year. Get a tetanus shot every 10 years.     Nutrition:    Eat at least 5 servings of fruits and vegetables daily.     Eat whole-grain bread, whole-wheat pasta and brown rice instead of white grains and rice.     Get adequate Calcium and Vitamin D.     Lifestyle    Exercise for at least 150 minutes a week (30 minutes a day, 5 days a week). This will help you control your weight and prevent disease.     Limit alcohol to one drink per day.     No smoking.     Wear sunscreen to prevent skin cancer.     See your dentist every six months for an exam and cleaning.     See your eye doctor every 1 to 2 years.

## 2019-10-11 ENCOUNTER — OFFICE VISIT (OUTPATIENT)
Dept: FAMILY MEDICINE | Facility: OTHER | Age: 54
End: 2019-10-11
Payer: COMMERCIAL

## 2019-10-11 VITALS
DIASTOLIC BLOOD PRESSURE: 70 MMHG | BODY MASS INDEX: 25.47 KG/M2 | OXYGEN SATURATION: 99 % | WEIGHT: 188 LBS | HEART RATE: 54 BPM | RESPIRATION RATE: 16 BRPM | TEMPERATURE: 97.1 F | HEIGHT: 72 IN | SYSTOLIC BLOOD PRESSURE: 112 MMHG

## 2019-10-11 DIAGNOSIS — Z00.00 ROUTINE GENERAL MEDICAL EXAMINATION AT A HEALTH CARE FACILITY: Primary | ICD-10-CM

## 2019-10-11 DIAGNOSIS — M70.62 GREATER TROCHANTERIC BURSITIS OF LEFT HIP: ICD-10-CM

## 2019-10-11 DIAGNOSIS — Z12.11 COLON CANCER SCREENING: ICD-10-CM

## 2019-10-11 DIAGNOSIS — M25.552 LEFT HIP PAIN: ICD-10-CM

## 2019-10-11 PROCEDURE — 99396 PREV VISIT EST AGE 40-64: CPT | Performed by: FAMILY MEDICINE

## 2019-10-11 PROCEDURE — 99213 OFFICE O/P EST LOW 20 MIN: CPT | Mod: 25 | Performed by: FAMILY MEDICINE

## 2019-10-11 RX ORDER — MELOXICAM 15 MG/1
15 TABLET ORAL DAILY
Qty: 30 TABLET | Refills: 1 | Status: SHIPPED | OUTPATIENT
Start: 2019-10-11 | End: 2020-03-25

## 2019-10-11 ASSESSMENT — ENCOUNTER SYMPTOMS
SORE THROAT: 0
HEADACHES: 0
NAUSEA: 0
FEVER: 0
CONSTIPATION: 0
NERVOUS/ANXIOUS: 0
EYE PAIN: 0
ARTHRALGIAS: 0
DYSURIA: 0
FREQUENCY: 0
HEMATOCHEZIA: 0
CHILLS: 0
HEARTBURN: 0
HEMATURIA: 0
JOINT SWELLING: 0
DIZZINESS: 0
DIARRHEA: 0
PARESTHESIAS: 0
MYALGIAS: 0
COUGH: 1
SHORTNESS OF BREATH: 0
WEAKNESS: 0
PALPITATIONS: 0
ABDOMINAL PAIN: 1

## 2019-10-11 ASSESSMENT — PAIN SCALES - GENERAL: PAINLEVEL: NO PAIN (0)

## 2019-10-11 ASSESSMENT — MIFFLIN-ST. JEOR: SCORE: 1747.76

## 2019-10-14 ENCOUNTER — TELEPHONE (OUTPATIENT)
Dept: FAMILY MEDICINE | Facility: OTHER | Age: 54
End: 2019-10-14

## 2019-10-16 NOTE — TELEPHONE ENCOUNTER
Left message for patient to return call to schedule EGD/colonoscopy. If Marialuisa or Yolanda are not available, please transfer to same day surgery

## 2019-10-18 NOTE — TELEPHONE ENCOUNTER
Date of colonoscopy/EGD: 12/23  Surgeon: Dr. Rogers  Prep:Miralax  Packet:Colonoscopy/EGD instructions mailed to patient's home address.   Date: 10/18/2019      Surgery Scheduler

## 2019-11-06 NOTE — TELEPHONE ENCOUNTER
Patient would like to keep colonoscopy this week. Please move to 12/24 at 7:30am with Dr. Huizar. Patient does not need new prep instructions.

## 2019-12-23 ENCOUNTER — ANESTHESIA EVENT (OUTPATIENT)
Dept: GASTROENTEROLOGY | Facility: CLINIC | Age: 54
End: 2019-12-23
Payer: COMMERCIAL

## 2019-12-23 NOTE — ANESTHESIA PREPROCEDURE EVALUATION
Anesthesia Pre-Procedure Evaluation    Patient: Frandy Jerez   MRN: [id number] : 1965          Preoperative Diagnosis: Special screening for malignant neoplasm of colon [Z12.11]    Procedure(s):  COLONOSCOPY    No past medical history on file.  Past Surgical History:   Procedure Laterality Date     COLONOSCOPY       EYE SURGERY       HERNIA REPAIR, INGUINAL RT/LT  09/09/10    Bilateral     SURGICAL HISTORY OF -       great toenail removed, both feet     SURGICAL HISTORY OF -       wisdom teeth       Anesthesia Evaluation     . Pt has had prior anesthetic. Type: MAC and General           ROS/MED HX    ENT/Pulmonary:  - neg pulmonary ROS     Neurologic:  - neg neurologic ROS   (+)neuropathy - resolved,     Cardiovascular:     (+) Dyslipidemia, ----. : . . . :. .       METS/Exercise Tolerance:     Hematologic:  - neg hematologic  ROS       Musculoskeletal:   (+) fracture lower extremity, -       GI/Hepatic:     (+) GERD       Renal/Genitourinary:  - ROS Renal section negative       Endo:  - neg endo ROS       Psychiatric:  - neg psychiatric ROS       Infectious Disease:  - neg infectious disease ROS       Malignancy:      - no malignancy   Other:    - neg other ROS                      Physical Exam  Normal systems: cardiovascular, pulmonary and dental    Airway   Mallampati: II  TM distance: >3 FB  Neck ROM: full    Dental     Cardiovascular   Rhythm and rate: regular and normal      Pulmonary    breath sounds clear to auscultation            Lab Results   Component Value Date    WBC 6.3 2012    HGB 14.6 2012    HCT 43.4 2012     2012     2018    POTASSIUM 4.1 2018    CHLORIDE 105 2018    CO2 30 2018    BUN 16 2018    CR 0.96 2018    GLC 86 2018    JADON 8.7 2018    ALBUMIN 4.6 2012    PROTTOTAL 8.2 2012    ALT 46 2012    AST 38 2012    ALKPHOS 72 2012    BILITOTAL 0.8 2012       Preop  "Vitals  BP Readings from Last 3 Encounters:   10/11/19 112/70   07/20/18 123/74   02/09/18 110/70    Pulse Readings from Last 3 Encounters:   10/11/19 54   02/09/18 77   01/24/18 73      Resp Readings from Last 3 Encounters:   10/11/19 16   02/09/18 16   01/24/18 16    SpO2 Readings from Last 3 Encounters:   10/11/19 99%   02/09/18 99%   01/24/18 99%      Temp Readings from Last 1 Encounters:   10/11/19 97.1  F (36.2  C) (Temporal)    Ht Readings from Last 1 Encounters:   10/11/19 1.84 m (6' 0.44\")      Wt Readings from Last 1 Encounters:   10/11/19 85.3 kg (188 lb)    Estimated body mass index is 25.19 kg/m  as calculated from the following:    Height as of 10/11/19: 1.84 m (6' 0.44\").    Weight as of 10/11/19: 85.3 kg (188 lb).       Anesthesia Plan      History & Physical Review  History and physical reviewed and following examination; no interval change.H and P to be performed by dr scanlon prior to anesthetic.     ASA Status:  2 .    NPO Status:  > 6 hours    Plan for MAC with Intravenous and Propofol induction. Maintenance will be TIVA.  Reason for MAC:  Deep or markedly invasive procedure (G8)         Postoperative Care      Consents  Anesthetic plan, risks, benefits and alternatives discussed with:  Patient.  Use of blood products discussed: No .   .                 DAIJA Dumont CRNA  "

## 2019-12-24 ENCOUNTER — ANESTHESIA (OUTPATIENT)
Dept: GASTROENTEROLOGY | Facility: CLINIC | Age: 54
End: 2019-12-24
Payer: COMMERCIAL

## 2019-12-24 ENCOUNTER — HOSPITAL ENCOUNTER (OUTPATIENT)
Facility: CLINIC | Age: 54
Discharge: HOME | End: 2019-12-24
Attending: SURGERY | Admitting: SURGERY
Payer: COMMERCIAL

## 2019-12-24 VITALS
HEART RATE: 64 BPM | TEMPERATURE: 97.5 F | SYSTOLIC BLOOD PRESSURE: 103 MMHG | RESPIRATION RATE: 16 BRPM | DIASTOLIC BLOOD PRESSURE: 60 MMHG | OXYGEN SATURATION: 96 %

## 2019-12-24 LAB — COLONOSCOPY: NORMAL

## 2019-12-24 PROCEDURE — 37000008 ZZH ANESTHESIA TECHNICAL FEE, 1ST 30 MIN: Performed by: SURGERY

## 2019-12-24 PROCEDURE — 25800030 ZZH RX IP 258 OP 636: Performed by: NURSE ANESTHETIST, CERTIFIED REGISTERED

## 2019-12-24 PROCEDURE — G0121 COLON CA SCRN NOT HI RSK IND: HCPCS | Performed by: SURGERY

## 2019-12-24 PROCEDURE — 25000128 H RX IP 250 OP 636: Performed by: NURSE ANESTHETIST, CERTIFIED REGISTERED

## 2019-12-24 PROCEDURE — 45378 DIAGNOSTIC COLONOSCOPY: CPT | Performed by: SURGERY

## 2019-12-24 PROCEDURE — 25000125 ZZHC RX 250: Performed by: NURSE ANESTHETIST, CERTIFIED REGISTERED

## 2019-12-24 RX ORDER — LIDOCAINE 40 MG/G
CREAM TOPICAL
Status: DISCONTINUED | OUTPATIENT
Start: 2019-12-24 | End: 2019-12-24

## 2019-12-24 RX ORDER — ONDANSETRON 2 MG/ML
4 INJECTION INTRAMUSCULAR; INTRAVENOUS EVERY 6 HOURS PRN
Status: DISCONTINUED | OUTPATIENT
Start: 2019-12-24 | End: 2019-12-24 | Stop reason: HOSPADM

## 2019-12-24 RX ORDER — SODIUM CHLORIDE, SODIUM LACTATE, POTASSIUM CHLORIDE, CALCIUM CHLORIDE 600; 310; 30; 20 MG/100ML; MG/100ML; MG/100ML; MG/100ML
INJECTION, SOLUTION INTRAVENOUS CONTINUOUS
Status: DISCONTINUED | OUTPATIENT
Start: 2019-12-24 | End: 2019-12-24 | Stop reason: HOSPADM

## 2019-12-24 RX ORDER — PROPOFOL 10 MG/ML
INJECTION, EMULSION INTRAVENOUS PRN
Status: DISCONTINUED | OUTPATIENT
Start: 2019-12-24 | End: 2019-12-24

## 2019-12-24 RX ORDER — NALOXONE HYDROCHLORIDE 0.4 MG/ML
.1-.4 INJECTION, SOLUTION INTRAMUSCULAR; INTRAVENOUS; SUBCUTANEOUS
Status: DISCONTINUED | OUTPATIENT
Start: 2019-12-24 | End: 2019-12-24 | Stop reason: HOSPADM

## 2019-12-24 RX ORDER — LIDOCAINE 40 MG/G
CREAM TOPICAL
Status: DISCONTINUED | OUTPATIENT
Start: 2019-12-24 | End: 2019-12-24 | Stop reason: HOSPADM

## 2019-12-24 RX ORDER — PROPOFOL 10 MG/ML
INJECTION, EMULSION INTRAVENOUS CONTINUOUS PRN
Status: DISCONTINUED | OUTPATIENT
Start: 2019-12-24 | End: 2019-12-24

## 2019-12-24 RX ORDER — ONDANSETRON 2 MG/ML
4 INJECTION INTRAMUSCULAR; INTRAVENOUS
Status: DISCONTINUED | OUTPATIENT
Start: 2019-12-24 | End: 2019-12-24

## 2019-12-24 RX ORDER — ONDANSETRON 4 MG/1
4 TABLET, ORALLY DISINTEGRATING ORAL EVERY 6 HOURS PRN
Status: DISCONTINUED | OUTPATIENT
Start: 2019-12-24 | End: 2019-12-24 | Stop reason: HOSPADM

## 2019-12-24 RX ORDER — FLUMAZENIL 0.1 MG/ML
0.2 INJECTION, SOLUTION INTRAVENOUS
Status: DISCONTINUED | OUTPATIENT
Start: 2019-12-24 | End: 2019-12-24 | Stop reason: HOSPADM

## 2019-12-24 RX ORDER — LIDOCAINE HYDROCHLORIDE 20 MG/ML
INJECTION, SOLUTION INFILTRATION; PERINEURAL PRN
Status: DISCONTINUED | OUTPATIENT
Start: 2019-12-24 | End: 2019-12-24

## 2019-12-24 RX ADMIN — PROPOFOL 70 MG: 10 INJECTION, EMULSION INTRAVENOUS at 07:33

## 2019-12-24 RX ADMIN — SODIUM CHLORIDE, POTASSIUM CHLORIDE, SODIUM LACTATE AND CALCIUM CHLORIDE: 600; 310; 30; 20 INJECTION, SOLUTION INTRAVENOUS at 07:11

## 2019-12-24 RX ADMIN — LIDOCAINE HYDROCHLORIDE 40 MG: 20 INJECTION, SOLUTION INFILTRATION; PERINEURAL at 07:33

## 2019-12-24 RX ADMIN — PROPOFOL 150 MCG/KG/MIN: 10 INJECTION, EMULSION INTRAVENOUS at 07:33

## 2019-12-24 RX ADMIN — SODIUM CHLORIDE, POTASSIUM CHLORIDE, SODIUM LACTATE AND CALCIUM CHLORIDE: 600; 310; 30; 20 INJECTION, SOLUTION INTRAVENOUS at 07:23

## 2019-12-24 NOTE — ANESTHESIA CARE TRANSFER NOTE
Patient: Frandy Jerez    Procedure(s):  COLONOSCOPY    Diagnosis: Special screening for malignant neoplasm of colon [Z12.11]  Diagnosis Additional Information: No value filed.    Anesthesia Type:   MAC     Note:  Airway :Face Mask  Patient transferred to:Phase II  Handoff Report: Identifed the Patient, Identified the Reponsible Provider, Reviewed the pertinent medical history, Discussed the surgical course, Reviewed Intra-OP anesthesia mangement and issues during anesthesia, Set expectations for post-procedure period and Allowed opportunity for questions and acknowledgement of understanding      Vitals: (Last set prior to Anesthesia Care Transfer)    CRNA VITALS  12/24/2019 0720 - 12/24/2019 0750      12/24/2019             SpO2:  98 %                Electronically Signed By: DAIJA Dumont CRNA  December 24, 2019  7:50 AM

## 2019-12-24 NOTE — ANESTHESIA POSTPROCEDURE EVALUATION
Patient: Frandy Jerez    Procedure(s):  COLONOSCOPY    Diagnosis:Special screening for malignant neoplasm of colon [Z12.11]  Diagnosis Additional Information: No value filed.    Anesthesia Type:  MAC    Note:  Anesthesia Post Evaluation    Patient location during evaluation: Phase 2  Patient participation: Able to fully participate in evaluation  Level of consciousness: awake  Pain management: adequate  Airway patency: patent  Cardiovascular status: acceptable and hemodynamically stable  Respiratory status: acceptable, room air and nonlabored ventilation  Hydration status: stable  PONV: none     Anesthetic complications: None    Comments: Patient was happy with the anesthesia care received and no anesthesia related complications were noted.  I will follow up with the patient again if it is needed.        Last vitals:  Vitals:    12/24/19 0650 12/24/19 0750 12/24/19 0800   BP: 128/79 (!) 79/47 (!) 77/47   Pulse: 60 60 57   Resp: 16     Temp: 97.5  F (36.4  C)     SpO2: 97% 98% 94%         Electronically Signed By: DAIJA Dumont CRNA  December 24, 2019  8:13 AM

## 2019-12-24 NOTE — DISCHARGE INSTRUCTIONS
[facility]    Home Care Following Endoscopy          Activity:    You have just undergone an endoscopic procedure usually performed with conscious sedation.  Do not work or operate machinery (including a car) for at least 12 hours.      I encourage you to walk and attempt to pass this air as soon as possible.    Diet:    Return to the diet you were on before your procedure but eat lightly for the first 12-24 hours.    Drink plenty of water.    Resume any regular medications unless otherwise advised by your physician.  Please begin any new medication prescribed as a result of your procedure as directed by your physician.     If you had any biopsy or polyp removed please refrain from aspirin or aspirin products for 2 days.  If on Coumadin please restart as instructed by your physician.   Pain:    You may take Tylenol as needed for pain.  Expected Recovery:    You can expect some mild abdominal fullness and/or discomfort due to the air used to inflate your intestinal tract. It is also normal to have a mild sore throat after upper endoscopy.    Call Your Physician if You Have:    After Colonoscopy:  o Worsening persisting abdominal pain which is worse with activity.  o Fevers (>101 degrees F), chills or shakes.  o Passage of continued blood with bowel movements.   Any questions or concerns about your recovery, please call [phone] or after hours [phone] Nurse Advice Line.    Follow-up Care:    You should receive a call or letter with your results within 1 week. Please call if you have not received a notification of your results.

## 2019-12-24 NOTE — H&P
Patient seen for Endoscopy    HPI:  Patient is a 54 year old male with no active GI issues here for screening colonoscopy. Not taking blood thinning medications. No MI or CVA history. No issues with previous sedation. No recent acute illness.    Review Of Systems    Skin: negative  Ears/Nose/Throat: negative  Respiratory: No shortness of breath, dyspnea on exertion, cough, or hemoptysis  Cardiovascular: negative  Gastrointestinal: negative  Genitourinary: negative  Musculoskeletal: negative  Neurologic: negative  Hematologic/Lymphatic/Immunologic: negative  Endocrine: negative      History reviewed. No pertinent past medical history.    Past Surgical History:   Procedure Laterality Date     COLONOSCOPY  2009     EYE SURGERY       HERNIA REPAIR, INGUINAL RT/LT  09/09/10    Bilateral     SURGICAL HISTORY OF -       great toenail removed, both feet     SURGICAL HISTORY OF -       wisdom teeth       Family History   Problem Relation Age of Onset     Thyroid Disease Mother      Macular Degeneration Mother      Leukemia Mother 80        CLL     Asthma Father      Coronary Artery Disease Paternal Grandfather      Colon Cancer No family hx of      Breast Cancer No family hx of      Prostate Cancer No family hx of      Diabetes No family hx of      Hypertension No family hx of      Hyperlipidemia No family hx of      Cerebrovascular Disease No family hx of        Social History     Socioeconomic History     Marital status:      Spouse name: Nikkie     Number of children: 3     Years of education: Not on file     Highest education level: Not on file   Occupational History     Employer: [employer]     Comment:    Social Needs     Financial resource strain: Not on file     Food insecurity:     Worry: Not on file     Inability: Not on file     Transportation needs:     Medical: Not on file     Non-medical: Not on file   Tobacco Use     Smoking status: Never Smoker     Smokeless tobacco: Never Used    Substance and Sexual Activity     Alcohol use: Yes     Alcohol/week: 0.0 standard drinks     Comment: rare     Drug use: No     Sexual activity: Yes     Partners: Female     Birth control/protection: Condom   Lifestyle     Physical activity:     Days per week: Not on file     Minutes per session: Not on file     Stress: Not on file   Relationships     Social connections:     Talks on phone: Not on file     Gets together: Not on file     Attends [employer] service: Not on file     Active member of club or organization: Not on file     Attends meetings of clubs or organizations: Not on file     Relationship status: Not on file     Intimate partner violence:     Fear of current or ex partner: Not on file     Emotionally abused: Not on file     Physically abused: Not on file     Forced sexual activity: Not on file   Other Topics Concern     Parent/sibling w/ CABG, MI or angioplasty before 65F 55M? No   Social History Narrative     Not on file       No current outpatient medications on file.       Medications and history reviewed    Physical exam:  Vitals: /79   Pulse 60   Temp 97.5  F (36.4  C) (Oral)   Resp 16   SpO2 97%   BMI= There is no height or weight on file to calculate BMI.    Constitutional: Healthy, alert, non-distressed   Head: Normo-cephalic, atraumatic, no lesions, masses or tenderness   Cardiovascular: RRR, no new murmurs, +S1, +S2 heart sounds, no clicks, rubs or gallops   Respiratory: CTAB, no rales, rhonchi or wheezing, equal chest rise, good respiratory effort   Gastrointestinal: Soft, non-tender, non distended, no rebound rigidity or guarding, no masses or hernias palpated   : Deferred  Musculoskeletal: Moves all extremities, normal  strength, no deformities noted   Skin: No suspicious lesions or rashes   Psychiatric: Mentation appears normal, affect appropriate   Hematologic/Lymphatic/Immunologic: Normal cervical and supraclavicular lymph nodes   Patient able to get up on table  without difficulty.    Labs show:  No results found for this or any previous visit (from the past 24 hour(s)).    Assessment: Endoscopy  Plan: Pt cleared for anesthesia for proposed procedure.    Song Huizar DO

## 2020-03-01 ENCOUNTER — HEALTH MAINTENANCE LETTER (OUTPATIENT)
Age: 55
End: 2020-03-01

## 2020-03-23 ENCOUNTER — TELEPHONE (OUTPATIENT)
Dept: FAMILY MEDICINE | Facility: OTHER | Age: 55
End: 2020-03-23

## 2020-03-23 NOTE — TELEPHONE ENCOUNTER
"This pt would like to set up a phone visit with Dr. Acevedo. ...MONICA Crespo      : [id number]  PHONE #'s: [phone] (home) [phone] (work)  PRESENTING PROBLEM:  C/O mild ache, intermittent on left side of sternum. New SX.  \" Just kind of an ache.  I did do some push ups that I a haven't done for a long time.NOt sure if this could be the cause of the mild ache or if heart?  \"     NURSING ASSESSMENT  Description:  Mild , very mild discomfort, just noticed something different.   Onset/duration:  Yesterday  Evening.   Precip. factors:   NO Familiy Hx of Heart Dx. Is NOT a diabetic. NO heart or HTN meds.    Assoc. Sx:  NONE. NO nausea, no weakness, no SOB,   Improves/worsens Sx:  same  Pain scale (1-10)   2 or 3/10  Sx specific meds:  Just Vitamin C  Last exam/Tx:  Has NOT been seen for this.     RECOMMENDED DISPOSITION:  Home care advice - IT doesn't sound like this is a medical emergency, but RN did review the Signs and Sx below:  Seek emergency care Immediately If Any of the Following Occur:  * continuous chest pain accompanied with chest tightness, pressure, or  if it is accompanied by:    * Shortness of breath    * dizziness    * Cool, moist skin    * Nausea or vomiting    * Pain in the neck, shoulders, jaw, teeth, back , or arms.     * Blue of gray face, lips, earlobes, or fingernails,    * heart palpiataions.   * NO relief from repeated Nitroglycerin every 5 minutes for three doses.    Pt would like to speak with Dr. Acevedo via phone visit about his symptoms. [phone] (home)     Will comply with recommendation: He will be  Home to be reached for a phone visit.   If further questions/concerns or if Sx do not improve, worsen or new Sx develop, call your PCP or [facility] as soon as possible.    NOTES:  Disposition was determined by the first positive assessment question, therefore all previous assessment questions were negative.  Informed to check provider manual or call insurance " company to assure coverage.    Guideline used: Chest Pain  Telephone Triage Protocols for Nurses, Fifth Edition, Zeina Paul RN  March 23, 2020

## 2020-03-24 NOTE — TELEPHONE ENCOUNTER
OK for workin telephone encounter 3/25/20 at 1:30pm . Please call patient  to schedule time.  Electronically signed by Tk Acevedo MD

## 2020-03-24 NOTE — TELEPHONE ENCOUNTER
Reached out to patient, relayed message, appt scheduled    Gena Avendano ~ Patient Representative  [address] [address]  [email or web address]  [email or web address]  Office:  [phone]  Fax:  [phone]

## 2020-03-25 ENCOUNTER — VIRTUAL VISIT (OUTPATIENT)
Dept: FAMILY MEDICINE | Facility: OTHER | Age: 55
End: 2020-03-25
Payer: COMMERCIAL

## 2020-03-25 DIAGNOSIS — H69.92 DYSFUNCTION OF LEFT EUSTACHIAN TUBE: ICD-10-CM

## 2020-03-25 DIAGNOSIS — M94.0 COSTOCHONDRITIS: Primary | ICD-10-CM

## 2020-03-25 PROCEDURE — 99213 OFFICE O/P EST LOW 20 MIN: CPT | Mod: TEL | Performed by: FAMILY MEDICINE

## 2020-03-25 NOTE — PROGRESS NOTES
"Frandy Jerez is a 54 year old male who is being evaluated via a billable telephone visit.      The patient has been notified of following:     \"This telephone visit will be conducted via a call between you and your physician/provider. We have found that certain health care needs can be provided without the need for a physical exam.  This service lets us provide the care you need with a short phone conversation.  If a prescription is necessary we can send it directly to your pharmacy.  If lab work is needed we can place an order for that and you can then stop by our lab to have the test done at a later time.    If during the course of the call the physician/provider feels a telephone visit is not appropriate, you will not be charged for this service.\"     Frandy Jerez complains of    Chief Complaint   Patient presents with     Chest Pain     left mild      Ear Problem     hear heart beat in ear        I have reviewed and updated the patient's Past Medical History, Social History, Family History and Medication List.      Patient Active Problem List   Diagnosis     CARDIOVASCULAR SCREENING; LDL GOAL LESS THAN 160     GERD (gastroesophageal reflux disease)      Family History   Problem Relation Age of Onset     Thyroid Disease Mother      Macular Degeneration Mother      Leukemia Mother 80        CLL     Asthma Father      Coronary Artery Disease Paternal Grandfather      Heart Surgery Paternal Grandfather      Colon Cancer No family hx of      Breast Cancer No family hx of      Prostate Cancer No family hx of      Diabetes No family hx of      Hypertension No family hx of      Hyperlipidemia No family hx of      Cerebrovascular Disease No family hx of        Past Surgical History:   Procedure Laterality Date     COLONOSCOPY  2009     COLONOSCOPY N/A 12/24/2019    Procedure: COLONOSCOPY;  Surgeon: Song Huizar DO;  Location:  GI     EYE SURGERY       HERNIA REPAIR, INGUINAL RT/LT  09/09/10    Bilateral     " SURGICAL HISTORY OF -       great toenail removed, both feet     SURGICAL HISTORY OF -       wisdom teeth       Current Outpatient Medications   Medication Sig Dispense Refill     IBUPROFEN PO          ALLERGIES  Patient has no known allergies.     Chest pain mild left side of sternum, left ear with heart beat in ear.       Additional provider notes: Chest Pain  Duration of complaint: 3 days   Description:   Location:  left side  Character: sharp and point tendere  Radiation: none  Duration: waxing and waning and intermittent   Intensity: mild  Progression of Symptoms:  improving  Accompanying Signs & Symptoms:  Shortness of breath: no  Sweating: no  Nausea/vomiting: no  Lightheadedness: no  Palpitations: no  Fever/Chills: no  Cough: no  Heartburn: no   History:   Family history of heart disease no  Tobacco use: no  Precipitating factors:   Worse with exertion: no  Worse with deep breaths :  no  Related to food: no  Alleviating factors: resolving  Therapies Tried and outcome: none      Assessment/Plan:  1. Costochondritis  Rest the affected painful area as much as possible.  Apply heat or ice for 15-20 minutes intermittently as needed and especially after any offending activity. Daily stretching.  As pain recedes, begin normal activities slowly as tolerated.  Consider Physical Therapy if symptoms not better with symptomatic care.     2. Dysfunction of left eustachian tube  Symptomatic therapy suggested: gargle for sore throat, use mist at bedside for congestion.  Apply facial warm packs for sinus pain.  May use Coricidin HBP or antihistamine prn.    Patient Instructions       Patient Education     Costochondritis    Costochondritis is inflammation of a rib or the cartilage that connects a rib to your breastbone (sternum). It causes tenderness, and sometimes chest pain may be sharp or aching, or it may feel like pressure. Pain may get worse with deep breathing, movement, or exercise. In some cases, the pain is  mistaken for a heart attack. Despite this, the condition is not serious. Read on to learn more about the condition and how it can be treated.  What causes costochondritis?  The cause of costochondritis is not completely clear, but it may happen after a chest injury, chest infection, or coughing episode. Some physical activities can sometimes lead to costochondritis. Large-breasted women may be more likely to have the condition. Often, the reason for the inflammation is unknown.  Diagnosing costochondritis  There is no test for costochondritis. The condition is diagnosed by the symptoms you have. Your healthcare provider will perform a physical exam. He or she will ask you about your symptoms and examine your chest for tenderness. In some cases, tests are done to rule out more serious problems. These tests may include imaging tests such as chest X-ray, CT scan, or an ECG.  Treating costochondritis  If an underlying cause is found, treatment for that will likely relieve the problem. Costochondritis often goes away on its own. The course of the condition varies from person to person. It usually lasts from weeks to months. In some cases, mild symptoms continue for months to years. To ease symptoms:    Take medicine as directed. These relieve pain and swelling. Ibuprofen or other NSAIDs are often recommended. In some cases, you may be given prescription medicine, such as muscle relaxants.    Avoid activities that put stress on the chest or spine.    Apply a heating pad (set to warm, not too high, heat) to the breastbone several times a day.    Perform stretching exercises as directed.  Call the healthcare provider right away if you have any of the following:    Pain that is not relieved by medicine    Shortness of breath    Lightheadedness, dizziness, or fainting    Feeling of irregular heartbeat or fast pulse  Anyone with chest pain should see a healthcare provider, especially those who are older and may be at risk for  heart disease.   Date Last Reviewed: 10/1/2016    0042-6583 [employer]. [address] [place] [address]. All rights reserved. This information is not intended as a substitute for professional medical care. Always follow your healthcare professional's instructions.           Patient Education     Common Middle Ear Problems    Your middle ear may have been injured or infected recently. Over time, certain growths or bone disease can also harm the middle ear. Left untreated, middle ear problems often lead to lifelong hearing loss. There are two types of hearing loss: conductive and sensorineural. One or both kinds can occur. Injury, infection, certain growths, or bone disease can cause your symptoms. A ruptured eardrum or a long-lasting (chronic) ear infection may be painful and decrease hearing.  Symptoms    Hearing loss in one or both ears    Fluid, often smelly, draining from the ear    Pain, pressure, or discomfort in the ear    Ringing in the ear  Conductive and sensorineural hearing loss  Sound waves may be disrupted before they reach the inner ear. If this happens, conductive hearing loss may occur. The ear canal can be blocked by wax, infection, a tumor, or a foreign object. The eardrum can be injured or infected. Abnormal bone growth, infection, or tumors in the middle ear can block sound waves.  Sound waves may not be processed correctly in the inner ear. If this happens, sensorineural hearing loss may occur. Permanent hearing loss is most commonly associated with sensorineural problems.  The tests and evaluations used to diagnose what type of hearing problem you have will depend on your symptoms.   Date Last Reviewed: 10/1/2016    5486-6178 [employer]. [address] [place] [address]. All rights reserved. This information is not intended as a substitute for professional medical care. Always follow your healthcare professional's instructions.           Patient  Education     Chest Wall Pain: Costochondritis    The chest pain that you have had today is caused by costochondritis. This condition is caused by an inflammation of the cartilage joining your ribs to your breastbone. It is not caused by heart or lung problems. Your healthcare team has made sure that the chest pain you feel is not from a life threatening cause of chest pain such as heart attack, collapsed lung, blood clot in the lung, tear in the aorta, or esophageal rupture. The inflammation may have been brought on by a blow to the chest, lifting heavy objects, intense exercise, or an illness that made you cough and sneeze a lot. It often occurs during times of emotional stress. It can be painful, but it is not dangerous. It usually goes away in 1 to 2 weeks. But it may happen again. Rarely, a more serious condition may cause symptoms similar to costochondritis. That s why it s important to watch for the warning signs listed below.  Home care  Follow these guidelines when caring for yourself at home:    If you feel that emotional stress is a cause of your condition, try to figure out the sources of that stress. It may not be obvious. Learn ways to deal with the stress in your life. This can include regular exercise, muscle relaxation, meditation, or simply taking time out for yourself.    You may use acetaminophen, ibuprofen, or naproxen to control pain, unless another pain medicine was prescribed. If you have liver or kidney disease or ever had a stomach ulcer, talk with your healthcare provider before using these medicines.    You can also help ease pain by using a hot, wet compress or heating pad. Use this with or without a medicated skin cream that helps relieves pain.    Do stretching exercise as advised by your provider.    Take any prescribed medicines as directed.  Follow-up care  Follow up with your healthcare provider, or as advised, if you do not start to get better in the next 2 days.  When to seek  medical advice  Call your healthcare provider right away if any of these occur:    A change in the type of pain. Call if it feels different, becomes more serious, lasts longer, or spreads into your shoulder, arm, neck, jaw, or back.    Shortness of breath or pain gets worse when you breathe    Weakness, dizziness, or fainting    Cough with dark-colored sputum (phlegm) or blood    Abdominal pain    Dark red or black stools    Fever of 100.4 F (38 C) or higher, or as directed by your healthcare provider  Date Last Reviewed: 12/1/2016 2000-2019 [employer]. [address] [place] [address]. All rights reserved. This information is not intended as a substitute for professional medical care. Always follow your healthcare professional's instructions.           Return for If symptoms worsen or fail to improve.      Phone call duration:  13:30 minutes    Tk Acevedo MD

## 2020-03-25 NOTE — PATIENT INSTRUCTIONS
Patient Education     Costochondritis    Costochondritis is inflammation of a rib or the cartilage that connects a rib to your breastbone (sternum). It causes tenderness, and sometimes chest pain may be sharp or aching, or it may feel like pressure. Pain may get worse with deep breathing, movement, or exercise. In some cases, the pain is mistaken for a heart attack. Despite this, the condition is not serious. Read on to learn more about the condition and how it can be treated.  What causes costochondritis?  The cause of costochondritis is not completely clear, but it may happen after a chest injury, chest infection, or coughing episode. Some physical activities can sometimes lead to costochondritis. Large-breasted women may be more likely to have the condition. Often, the reason for the inflammation is unknown.  Diagnosing costochondritis  There is no test for costochondritis. The condition is diagnosed by the symptoms you have. Your healthcare provider will perform a physical exam. He or she will ask you about your symptoms and examine your chest for tenderness. In some cases, tests are done to rule out more serious problems. These tests may include imaging tests such as chest X-ray, CT scan, or an ECG.  Treating costochondritis  If an underlying cause is found, treatment for that will likely relieve the problem. Costochondritis often goes away on its own. The course of the condition varies from person to person. It usually lasts from weeks to months. In some cases, mild symptoms continue for months to years. To ease symptoms:    Take medicine as directed. These relieve pain and swelling. Ibuprofen or other NSAIDs are often recommended. In some cases, you may be given prescription medicine, such as muscle relaxants.    Avoid activities that put stress on the chest or spine.    Apply a heating pad (set to warm, not too high, heat) to the breastbone several times a day.    Perform stretching exercises as  directed.  Call the healthcare provider right away if you have any of the following:    Pain that is not relieved by medicine    Shortness of breath    Lightheadedness, dizziness, or fainting    Feeling of irregular heartbeat or fast pulse  Anyone with chest pain should see a healthcare provider, especially those who are older and may be at risk for heart disease.   Date Last Reviewed: 10/1/2016    7900-4626 [employer]. [address] [place] [address], [place] [address]. All rights reserved. This information is not intended as a substitute for professional medical care. Always follow your healthcare professional's instructions.           Patient Education     Common Middle Ear Problems    Your middle ear may have been injured or infected recently. Over time, certain growths or bone disease can also harm the middle ear. Left untreated, middle ear problems often lead to lifelong hearing loss. There are two types of hearing loss: conductive and sensorineural. One or both kinds can occur. Injury, infection, certain growths, or bone disease can cause your symptoms. A ruptured eardrum or a long-lasting (chronic) ear infection may be painful and decrease hearing.  Symptoms    Hearing loss in one or both ears    Fluid, often smelly, draining from the ear    Pain, pressure, or discomfort in the ear    Ringing in the ear  Conductive and sensorineural hearing loss  Sound waves may be disrupted before they reach the inner ear. If this happens, conductive hearing loss may occur. The ear canal can be blocked by wax, infection, a tumor, or a foreign object. The eardrum can be injured or infected. Abnormal bone growth, infection, or tumors in the middle ear can block sound waves.  Sound waves may not be processed correctly in the inner ear. If this happens, sensorineural hearing loss may occur. Permanent hearing loss is most commonly associated with sensorineural problems.  The tests and evaluations used to diagnose what type  of hearing problem you have will depend on your symptoms.   Date Last Reviewed: 10/1/2016    0684-3803 [employer]. [address] [place] [address], [place] [address]. All rights reserved. This information is not intended as a substitute for professional medical care. Always follow your healthcare professional's instructions.           Patient Education     Chest Wall Pain: Costochondritis    The chest pain that you have had today is caused by costochondritis. This condition is caused by an inflammation of the cartilage joining your ribs to your breastbone. It is not caused by heart or lung problems. Your healthcare team has made sure that the chest pain you feel is not from a life threatening cause of chest pain such as heart attack, collapsed lung, blood clot in the lung, tear in the aorta, or esophageal rupture. The inflammation may have been brought on by a blow to the chest, lifting heavy objects, intense exercise, or an illness that made you cough and sneeze a lot. It often occurs during times of emotional stress. It can be painful, but it is not dangerous. It usually goes away in 1 to 2 weeks. But it may happen again. Rarely, a more serious condition may cause symptoms similar to costochondritis. That s why it s important to watch for the warning signs listed below.  Home care  Follow these guidelines when caring for yourself at home:    If you feel that emotional stress is a cause of your condition, try to figure out the sources of that stress. It may not be obvious. Learn ways to deal with the stress in your life. This can include regular exercise, muscle relaxation, meditation, or simply taking time out for yourself.    You may use acetaminophen, ibuprofen, or naproxen to control pain, unless another pain medicine was prescribed. If you have liver or kidney disease or ever had a stomach ulcer, talk with your healthcare provider before using these medicines.    You can also help ease pain by using a  hot, wet compress or heating pad. Use this with or without a medicated skin cream that helps relieves pain.    Do stretching exercise as advised by your provider.    Take any prescribed medicines as directed.  Follow-up care  Follow up with your healthcare provider, or as advised, if you do not start to get better in the next 2 days.  When to seek medical advice  Call your healthcare provider right away if any of these occur:    A change in the type of pain. Call if it feels different, becomes more serious, lasts longer, or spreads into your shoulder, arm, neck, jaw, or back.    Shortness of breath or pain gets worse when you breathe    Weakness, dizziness, or fainting    Cough with dark-colored sputum (phlegm) or blood    Abdominal pain    Dark red or black stools    Fever of 100.4 F (38 C) or higher, or as directed by your healthcare provider  Date Last Reviewed: 12/1/2016 2000-2019 [employer]. [address] [place] [address]. All rights reserved. This information is not intended as a substitute for professional medical care. Always follow your healthcare professional's instructions.

## 2020-11-03 ENCOUNTER — VIRTUAL VISIT (OUTPATIENT)
Dept: FAMILY MEDICINE | Facility: OTHER | Age: 55
End: 2020-11-03

## 2020-11-03 NOTE — PROGRESS NOTES
"Date: 2020 10:05:29  Clinician: Brigido Otoole  Clinician NPI: [id number]  Patient: Frandy Jerez  Patient : 1965  Patient Address: [address] [place] [address]  Patient Phone: [phone]  Visit Protocol: URI  Patient Summary:  Frandy is a 55 year old ( : 1965 ) male who initiated a [facility] Visit for COVID-19 (Coronavirus) evaluation and screening. When asked the question \"Please sign me up to receive news, health information and promotions. \", Frandy responded \"No\".    Frandy states his symptoms started 1-2 days ago.   His symptoms consist of rhinitis, malaise, a cough, and nasal congestion. Frandy also feels feverish.   Symptom details     Nasal secretions: The color of his mucus is clear.    Cough: Frandy coughs a few times an hour and his cough is not more bothersome at night. Phlegm does not come into his throat when he coughs. He does not believe his cough is caused by post-nasal drip.     Temperature: His current temperature is 99.3 degrees Fahrenheit.      Frandy denies having vomiting, facial pain or pressure, myalgias, chills, sore throat, teeth pain, ageusia, diarrhea, ear pain, headache, wheezing, nausea, and anosmia. He also denies taking antibiotic medication in the past month and having recent facial or sinus surgery in the past 60 days. He is not experiencing dyspnea.   Precipitating events  He has not recently been exposed to someone with influenza. Frandy has been in close contact with the following high risk individuals: people with asthma, heart disease or diabetes.   Pertinent COVID-19 (Coronavirus) information  Frandy does not work or volunteer as healthcare worker or a . In the past 14 days, Frandy has not worked or volunteered at a healthcare facility or group living setting.   In the past 14 days, he also has not lived in a congregate living setting.   Frandy has not had a close contact with a laboratory-confirmed COVID-19 patient within 14 days of symptom onset.    " Since December 2019, Frandy has not been tested for COVID-19 and has not had upper respiratory infection or influenza-like illness.   Pertinent medical history  rFandy does not need a return to work/school note.   Weight: 195 lbs   Frandy does not smoke or use smokeless tobacco.   Weight: 195 lbs    MEDICATIONS: Mucinex D oral, ALLERGIES: NKDA  Clinician Response:  Dear Frandy,   Your symptoms show that you may have coronavirus (COVID-19). This illness can cause fever, cough and trouble breathing. Many people get a mild case and get better on their own. Some people can get very sick.  What should I do?  We would like to test you for this virus.   1. Please call [phone] to schedule your visit. Explain that you were referred by [facility] to have a COVID-19 test. Be ready to share your [facility] visit ID number.  The following will serve as your written order for this COVID Test, ordered by me, for the indication of suspected COVID [Z20.828]: The test will be ordered in [employer], our electronic health record, after you are scheduled. It will show as ordered and authorized by Jean Carlos Freed MD.  Order: COVID-19 (Coronavirus) PCR for SYMPTOMATIC testing from [facility].   2. When it's time for your COVID test:  Stay at least 6 feet away from others. (If someone will drive you to your test, stay in the backseat, as far away from the  as you can.)   Cover your mouth and nose with a mask, tissue or washcloth.  Go straight to the testing site. Don't make any stops on the way there or back.      3.Starting now: Stay home and away from others (self-isolate) until:   You've had no fever---and no medicine that reduces fever---for one full day (24 hours). And...   Your other symptoms have gotten better. For example, your cough or breathing has improved. And...   At least 10 days have passed since your symptoms started.       During this time, don't leave the house except for testing or medical care.   Stay in your own room, even for meals. Use  "your own bathroom if you can.   Stay away from others in your home. No hugging, kissing or shaking hands. No visitors.  Don't go to work, school or anywhere else.    Clean \"high touch\" surfaces often (doorknobs, counters, handles, etc.). Use a household cleaning spray or wipes. You'll find a full list of  on the EPA website: www.epa.gov/pesticide-registration/list-n-disinfectants-use-against-sars-cov-2.   Cover your mouth and nose with a mask, tissue or washcloth to avoid spreading germs.  Wash your hands and face often. Use soap and water.  Caregivers in these groups are at risk for severe illness due to COVID-19:  o People 65 years and older  o People who live in a nursing home or long-term care facility  o People with chronic disease (lung, heart, cancer, diabetes, kidney, liver, immunologic)  o People who have a weakened immune system, including those who:   Are in cancer treatment  Take medicine that weakens the immune system, such as corticosteroids  Had a bone marrow or organ transplant  Have an immune deficiency  Have poorly controlled HIV or AIDS  Are obese (body mass index of 40 or higher)  Smoke regularly   o Caregivers should wear gloves while washing dishes, handling laundry and cleaning bedrooms and bathrooms.  o Use caution when washing and drying laundry: Don't shake dirty laundry, and use the warmest water setting that you can.  o For more tips, go to www.cdc.gov/coronavirus/2019-ncov/downloads/10Things.pdf.    4.Sign up for [employer]. We know it's scary to hear that you might have COVID-19. We want to track your symptoms to make sure you're okay over the next 2 weeks. Please look for an email from [employer]---this is a free, online program that we'll use to keep in touch. To sign up, follow the link in the email. Learn more at [email or web address]  How can I take care of myself?   Get lots of rest. Drink extra fluids (unless a doctor has told you not to).   Take Tylenol " (acetaminophen) for fever or pain. If you have liver or kidney problems, ask your family doctor if it's okay to take Tylenol.   Adults can take either:    650 mg (two 325 mg pills) every 4 to 6 hours, or...   1,000 mg (two 500 mg pills) every 8 hours as needed.    Note: Don't take more than 3,000 mg in one day. Acetaminophen is found in many medicines (both prescribed and over-the-counter medicines). Read all labels to be sure you don't take too much.   For children, check the Tylenol bottle for the right dose. The dose is based on the child's age or weight.    If you have other health problems (like cancer, heart failure, an organ transplant or severe kidney disease): Call your specialty clinic if you don't feel better in the next 2 days.       Know when to call 911. Emergency warning signs include:    Trouble breathing or shortness of breath Pain or pressure in the chest that doesn't go away Feeling confused like you haven't felt before, or not being able to wake up Bluish-colored lips or face.  Where can I get more information?   [facility] -- About COVID-19: [email or web address]   CDC -- What to Do If You're Sick: www.cdc.gov/coronavirus/2019-ncov/about/steps-when-sick.html   CDC -- Ending Home Isolation: www.cdc.gov/coronavirus/2019-ncov/hcp/disposition-in-home-patients.html   CDC -- Caring for Someone: www.cdc.gov/coronavirus/2019-ncov/if-you-are-sick/care-for-someone.html   [facility] -- Interim Guidance for Hospital Discharge to Home: www.health.[facility].mn.us/diseases/coronavirus/hcp/hospdischarge.pdf   [facility] clinical trials (COVID-19 research studies): clinicalaffairs.[facility]/umn-clinical-trials    Below are the COVID-19 hotlines at the [place] Department of Health ([facility]). Interpreters are available.    For health questions: Call 327-706-5347 or 1-172.401.4654 (7 a.m. to 7 p.m.) For questions about schools and childcare: Call 110-900-8035 or 1-908.204.2261 (7 a.m. to 7 p.m.)     Diagnosis: Contact with and (suspected) exposure to other viral communicable diseases  Diagnosis ICD: Z20.828

## 2020-11-06 DIAGNOSIS — Z20.822 SUSPECTED 2019 NOVEL CORONAVIRUS INFECTION: Primary | ICD-10-CM

## 2020-11-06 PROCEDURE — U0003 INFECTIOUS AGENT DETECTION BY NUCLEIC ACID (DNA OR RNA); SEVERE ACUTE RESPIRATORY SYNDROME CORONAVIRUS 2 (SARS-COV-2) (CORONAVIRUS DISEASE [COVID-19]), AMPLIFIED PROBE TECHNIQUE, MAKING USE OF HIGH THROUGHPUT TECHNOLOGIES AS DESCRIBED BY CMS-2020-01-R: HCPCS | Performed by: FAMILY MEDICINE

## 2020-11-07 LAB
SARS-COV-2 RNA SPEC QL NAA+PROBE: ABNORMAL
SPECIMEN SOURCE: ABNORMAL

## 2020-11-28 ENCOUNTER — APPOINTMENT (OUTPATIENT)
Dept: GENERAL RADIOLOGY | Facility: CLINIC | Age: 55
End: 2020-11-28
Attending: FAMILY MEDICINE
Payer: COMMERCIAL

## 2020-11-28 ENCOUNTER — HOSPITAL ENCOUNTER (EMERGENCY)
Facility: CLINIC | Age: 55
Discharge: HOME | End: 2020-11-28
Attending: FAMILY MEDICINE | Admitting: FAMILY MEDICINE
Payer: COMMERCIAL

## 2020-11-28 ENCOUNTER — APPOINTMENT (OUTPATIENT)
Dept: CT IMAGING | Facility: CLINIC | Age: 55
End: 2020-11-28
Attending: FAMILY MEDICINE
Payer: COMMERCIAL

## 2020-11-28 VITALS
WEIGHT: 193.4 LBS | OXYGEN SATURATION: 94 % | HEART RATE: 84 BPM | SYSTOLIC BLOOD PRESSURE: 125 MMHG | DIASTOLIC BLOOD PRESSURE: 78 MMHG | RESPIRATION RATE: 16 BRPM | TEMPERATURE: 99.8 F | BODY MASS INDEX: 25.91 KG/M2

## 2020-11-28 DIAGNOSIS — I26.99 ACUTE PULMONARY EMBOLISM WITHOUT ACUTE COR PULMONALE, UNSPECIFIED PULMONARY EMBOLISM TYPE (H): ICD-10-CM

## 2020-11-28 DIAGNOSIS — U07.1 COVID-19: ICD-10-CM

## 2020-11-28 LAB
ALBUMIN SERPL-MCNC: 3.4 G/DL (ref 3.4–5)
ALP SERPL-CCNC: 113 U/L (ref 40–150)
ALT SERPL W P-5'-P-CCNC: 107 U/L (ref 0–70)
ANION GAP SERPL CALCULATED.3IONS-SCNC: 4 MMOL/L (ref 3–14)
AST SERPL W P-5'-P-CCNC: 59 U/L (ref 0–45)
BASOPHILS # BLD AUTO: 0 10E9/L (ref 0–0.2)
BASOPHILS NFR BLD AUTO: 0.2 %
BILIRUB SERPL-MCNC: 0.9 MG/DL (ref 0.2–1.3)
BUN SERPL-MCNC: 11 MG/DL (ref 7–30)
CALCIUM SERPL-MCNC: 9.3 MG/DL (ref 8.5–10.1)
CHLORIDE SERPL-SCNC: 106 MMOL/L (ref 94–109)
CO2 SERPL-SCNC: 28 MMOL/L (ref 20–32)
CREAT SERPL-MCNC: 0.92 MG/DL (ref 0.66–1.25)
CRP SERPL-MCNC: 58.8 MG/L (ref 0–8)
D DIMER PPP FEU-MCNC: 1.5 UG/ML FEU (ref 0–0.5)
DIFFERENTIAL METHOD BLD: ABNORMAL
EOSINOPHIL NFR BLD AUTO: 0.4 %
ERYTHROCYTE [DISTWIDTH] IN BLOOD BY AUTOMATED COUNT: 12.2 % (ref 10–15)
GFR SERPL CREATININE-BSD FRML MDRD: >90 ML/MIN/{1.73_M2}
GLUCOSE SERPL-MCNC: 110 MG/DL (ref 70–99)
HCT VFR BLD AUTO: 41.3 % (ref 40–53)
HGB BLD-MCNC: 13.7 G/DL (ref 13.3–17.7)
IMM GRANULOCYTES # BLD: 0.1 10E9/L (ref 0–0.4)
IMM GRANULOCYTES NFR BLD: 0.3 %
LYMPHOCYTES # BLD AUTO: 1.3 10E9/L (ref 0.8–5.3)
LYMPHOCYTES NFR BLD AUTO: 8.5 %
MCH RBC QN AUTO: 31.1 PG (ref 26.5–33)
MCHC RBC AUTO-ENTMCNC: 33.2 G/DL (ref 31.5–36.5)
MCV RBC AUTO: 94 FL (ref 78–100)
MONOCYTES # BLD AUTO: 1.2 10E9/L (ref 0–1.3)
MONOCYTES NFR BLD AUTO: 8 %
NEUTROPHILS # BLD AUTO: 12.6 10E9/L (ref 1.6–8.3)
NEUTROPHILS NFR BLD AUTO: 82.6 %
NRBC # BLD AUTO: 0 10*3/UL
NRBC BLD AUTO-RTO: 0 /100
NT-PROBNP SERPL-MCNC: 82 PG/ML (ref 0–900)
PLATELET # BLD AUTO: 209 10E9/L (ref 150–450)
POTASSIUM SERPL-SCNC: 4.1 MMOL/L (ref 3.4–5.3)
PROT SERPL-MCNC: 7.6 G/DL (ref 6.8–8.8)
RADIOLOGIST FLAGS: ABNORMAL
RBC # BLD AUTO: 4.4 10E12/L (ref 4.4–5.9)
SODIUM SERPL-SCNC: 138 MMOL/L (ref 133–144)
TROPONIN I SERPL-MCNC: <0.015 UG/L (ref 0–0.04)
WBC # BLD AUTO: 15.3 10E9/L (ref 4–11)

## 2020-11-28 PROCEDURE — 250N000011 HC RX IP 250 OP 636: Performed by: FAMILY MEDICINE

## 2020-11-28 PROCEDURE — 84484 ASSAY OF TROPONIN QUANT: CPT | Performed by: FAMILY MEDICINE

## 2020-11-28 PROCEDURE — 250N000009 HC RX 250: Performed by: FAMILY MEDICINE

## 2020-11-28 PROCEDURE — 99285 EMERGENCY DEPT VISIT HI MDM: CPT | Mod: 25 | Performed by: FAMILY MEDICINE

## 2020-11-28 PROCEDURE — 71275 CT ANGIOGRAPHY CHEST: CPT

## 2020-11-28 PROCEDURE — 83880 ASSAY OF NATRIURETIC PEPTIDE: CPT | Performed by: FAMILY MEDICINE

## 2020-11-28 PROCEDURE — 99284 EMERGENCY DEPT VISIT MOD MDM: CPT | Performed by: FAMILY MEDICINE

## 2020-11-28 PROCEDURE — 85025 COMPLETE CBC W/AUTO DIFF WBC: CPT | Performed by: FAMILY MEDICINE

## 2020-11-28 PROCEDURE — 86140 C-REACTIVE PROTEIN: CPT | Performed by: FAMILY MEDICINE

## 2020-11-28 PROCEDURE — 250N000013 HC RX MED GY IP 250 OP 250 PS 637: Performed by: FAMILY MEDICINE

## 2020-11-28 PROCEDURE — 85379 FIBRIN DEGRADATION QUANT: CPT | Performed by: FAMILY MEDICINE

## 2020-11-28 PROCEDURE — 80053 COMPREHEN METABOLIC PANEL: CPT | Performed by: FAMILY MEDICINE

## 2020-11-28 PROCEDURE — 71045 X-RAY EXAM CHEST 1 VIEW: CPT

## 2020-11-28 RX ORDER — ALBUTEROL SULFATE 90 UG/1
2 AEROSOL, METERED RESPIRATORY (INHALATION) EVERY 6 HOURS PRN
COMMUNITY

## 2020-11-28 RX ORDER — AZITHROMYCIN 250 MG/1
250 TABLET, FILM COATED ORAL DAILY
COMMUNITY
End: 2020-12-04

## 2020-11-28 RX ORDER — RIVAROXABAN 15 MG-20MG
KIT ORAL
Qty: 51 EACH | Refills: 0 | Status: SHIPPED | OUTPATIENT
Start: 2020-11-28 | End: 2020-12-04

## 2020-11-28 RX ORDER — ACETAMINOPHEN 500 MG
500-1000 TABLET ORAL EVERY 6 HOURS PRN
COMMUNITY

## 2020-11-28 RX ORDER — ACETAMINOPHEN 325 MG/1
975 TABLET ORAL ONCE
Status: COMPLETED | OUTPATIENT
Start: 2020-11-28 | End: 2020-11-28

## 2020-11-28 RX ORDER — IOPAMIDOL 755 MG/ML
500 INJECTION, SOLUTION INTRAVASCULAR ONCE
Status: COMPLETED | OUTPATIENT
Start: 2020-11-28 | End: 2020-11-28

## 2020-11-28 RX ADMIN — IOPAMIDOL 80 ML: 755 INJECTION, SOLUTION INTRAVENOUS at 09:22

## 2020-11-28 RX ADMIN — SODIUM CHLORIDE 70 ML: 9 INJECTION, SOLUTION INTRAVENOUS at 09:22

## 2020-11-28 RX ADMIN — ACETAMINOPHEN 975 MG: 325 TABLET, FILM COATED ORAL at 08:29

## 2020-11-28 NOTE — ED PROVIDER NOTES
History     Chief Complaint   Patient presents with     Shortness of Breath     HPI  Frandy Jerez is a 55 year old male who presents with worsening shortness of breath and right sided and back pain.  Patient was diagnosed with Covid about 3 weeks ago.  Patient never had any significant symptoms with that but then 2 days ago started having some pretty severe right-sided back pain.  Was worse with taking a deep breath.  Patient was feeling more short of breath.  Denies any chest pain or nausea or vomiting.  Patient denies any diarrhea.  Patient states his cough has not worsened.  Patient has been eating and drinking normally.  Patient states his breathing is worse with activity.    Allergies:  No Known Allergies    Problem List:    Patient Active Problem List    Diagnosis Date Noted     GERD (gastroesophageal reflux disease) 01/27/2011     Priority: Medium     CARDIOVASCULAR SCREENING; LDL GOAL LESS THAN 160 10/31/2010     Priority: Medium        Past Medical History:    No past medical history on file.    Past Surgical History:    Past Surgical History:   Procedure Laterality Date     COLONOSCOPY  2009     COLONOSCOPY N/A 12/24/2019    Procedure: COLONOSCOPY;  Surgeon: Song Huizar, ;  Location:  GI     EYE SURGERY       HERNIA REPAIR, INGUINAL RT/LT  09/09/10    Bilateral     SURGICAL HISTORY OF -       great toenail removed, both feet     SURGICAL HISTORY OF -       wisdom teeth       Family History:    Family History   Problem Relation Age of Onset     Thyroid Disease Mother      Macular Degeneration Mother      Leukemia Mother 80        CLL     Asthma Father      Coronary Artery Disease Paternal Grandfather      Heart Surgery Paternal Grandfather      Colon Cancer No family hx of      Breast Cancer No family hx of      Prostate Cancer No family hx of      Diabetes No family hx of      Hypertension No family hx of      Hyperlipidemia No family hx of      Cerebrovascular Disease No family hx of         Social History:  Marital Status:   [2]  Social History     Tobacco Use     Smoking status: Never Smoker     Smokeless tobacco: Never Used   Substance Use Topics     Alcohol use: Yes     Alcohol/week: 0.0 standard drinks     Comment: rare     Drug use: No        Medications:         acetaminophen (TYLENOL) 500 MG tablet       albuterol (PROAIR HFA/PROVENTIL HFA/VENTOLIN HFA) 108 (90 Base) MCG/ACT inhaler       azithromycin (ZITHROMAX) 250 MG tablet       IBUPROFEN PO          Review of Systems   All other systems reviewed and are negative.      Physical Exam   BP: (!) 146/100  Pulse: 86  Temp: 99.8  F (37.7  C)  Resp: 20  Weight: 87.7 kg (193 lb 6.4 oz)  SpO2: 96 %      Physical Exam  Vitals signs and nursing note reviewed.   Constitutional:       General: He is not in acute distress.     Appearance: He is well-developed. He is not diaphoretic.   HENT:      Head: Normocephalic and atraumatic.      Nose: Nose normal.      Mouth/Throat:      Pharynx: No oropharyngeal exudate.   Eyes:      General: No scleral icterus.     Conjunctiva/sclera: Conjunctivae normal.      Pupils: Pupils are equal, round, and reactive to light.   Neck:      Musculoskeletal: Normal range of motion.   Cardiovascular:      Rate and Rhythm: Normal rate and regular rhythm.      Heart sounds: Normal heart sounds. No murmur. No friction rub.   Pulmonary:      Effort: Pulmonary effort is normal. No respiratory distress.      Breath sounds: Normal breath sounds. No decreased breath sounds, wheezing or rales.   Abdominal:      General: Bowel sounds are normal. There is no distension.      Palpations: Abdomen is soft. There is no mass.      Tenderness: There is no abdominal tenderness. There is no guarding or rebound.   Musculoskeletal: Normal range of motion.         General: No tenderness.   Skin:     General: Skin is warm.      Findings: No rash.   Neurological:      Mental Status: He is alert and oriented to person, place, and time.    Psychiatric:         Judgment: Judgment normal.         ED Course        Procedures        Results for orders placed or performed during the hospital encounter of 11/28/20 (from the past 24 hour(s))   XR Chest Port 1 View    Narrative    CHEST ONE VIEW November 28, 2020 8:19 AM     HISTORY: Cough, shortness of breath. COVID positive.    COMPARISON: None.      Impression    IMPRESSION: No acute disease.    FELICE FLOREZ MD   CBC with platelets differential   Result Value Ref Range    WBC 15.3 (H) 4.0 - 11.0 10e9/L    RBC Count 4.40 4.4 - 5.9 10e12/L    Hemoglobin 13.7 13.3 - 17.7 g/dL    Hematocrit 41.3 40.0 - 53.0 %    MCV 94 78 - 100 fl    MCH 31.1 26.5 - 33.0 pg    MCHC 33.2 31.5 - 36.5 g/dL    RDW 12.2 10.0 - 15.0 %    Platelet Count 209 150 - 450 10e9/L    Diff Method Automated Method     % Neutrophils 82.6 %    % Lymphocytes 8.5 %    % Monocytes 8.0 %    % Eosinophils 0.4 %    % Basophils 0.2 %    % Immature Granulocytes 0.3 %    Nucleated RBCs 0 0 /100    Absolute Neutrophil 12.6 (H) 1.6 - 8.3 10e9/L    Absolute Lymphocytes 1.3 0.8 - 5.3 10e9/L    Absolute Monocytes 1.2 0.0 - 1.3 10e9/L    Absolute Basophils 0.0 0.0 - 0.2 10e9/L    Abs Immature Granulocytes 0.1 0 - 0.4 10e9/L    Absolute Nucleated RBC 0.0    D dimer quantitative   Result Value Ref Range    D Dimer 1.5 (H) 0.0 - 0.50 ug/ml FEU   Troponin I   Result Value Ref Range    Troponin I ES <0.015 0.000 - 0.045 ug/L   CRP inflammation   Result Value Ref Range    CRP Inflammation 58.8 (H) 0.0 - 8.0 mg/L   Nt probnp inpatient (BNP)   Result Value Ref Range    N-Terminal Pro BNP Inpatient 82 0 - 900 pg/mL   CT Chest Pulmonary Embolism w Contrast   Result Value Ref Range    Radiologist flags Pulmonary embolism (AA)     Narrative    CT CHEST PULMONARY EMBOLISM WITH CONTRAST November 28, 2020 9:39 AM    CLINICAL HISTORY: Right-sided back pain, elevated D-dimer. COVID  positive. Shortness of breath.     TECHNIQUE: CT angiogram chest during arterial phase  injection IV  contrast. 2D and 3D MIP reconstructions were performed by the CT  technologist. Dose reduction techniques were used.     CONTRAST: 80 mL Isovue 370     COMPARISON: None.    FINDINGS:  ANGIOGRAM CHEST: Positive study for peripheral right lower lobe  pulmonary emboli. No definite left-sided pulmonary emboli. Thoracic  aorta is negative for dissection. No CT evidence of right heart  strain.    LUNGS AND PLEURA: Mild to moderate bilateral lower lobe infiltrates.  Trace pleural fluid on the right. No left pleural effusion.    MEDIASTINUM/AXILLAE: A few borderline prominent mediastinal lymph  nodes are noted, presumably reactive in this setting. No aneurysm.    UPPER ABDOMEN: No acute findings.    MUSCULOSKELETAL: No frankly destructive bony lesions.      Impression    IMPRESSION:  1.  Positive study for pulmonary embolism to the right lower lobe.  2.  Bilateral lower lobe atelectasis and/or infiltrate. Trace pleural  fluid on the right.    [Critical Result: Pulmonary embolism]    Finding was identified on 11/28/2020 9:51 AM.     Dr. SOLANGE CALLES was contacted by me at 11/28/2020 10:02 AM  and verbalized understanding of the critical finding.        Medications   acetaminophen (TYLENOL) tablet 975 mg (975 mg Oral Given 11/28/20 0829)     Labs are reviewed and patient's D-dimer was elevated and white count was elevated.  CT scan does show findings consistent with a pulmonary embolism in the right lower lobe, this is where the patient is having most of his symptoms.  I think this is most likely complication from his COVID-19 infection recently.  There is question of bilateral lower lobe atelectasis or infiltrates but I think this is also likely residual from his Covid and not likely an atypical infection.  We will plan on starting the patient on Xarelto and will do 15 mg twice a day for 21 days and then 20 mg a day.  Patient will follow-up with his doctor in a week to make sure he is tolerating these  new medications well.  Patient was given signs and symptoms to watch for and reasons to return.  Patient has stable vitals, is not requiring oxygen, I think can be treated as an outpatient.    Assessments & Plan (with Medical Decision Making)  Pulmonary embolism, COVID-19     I have reviewed the nursing notes.    I have reviewed the findings, diagnosis, plan and need for follow up with the patient.              11/28/2020   [facility] EMERGENCY DEPT     Jayjay Matos MD  11/28/20 1017

## 2020-11-28 NOTE — ED TRIAGE NOTES
He had covid diagnosed 11/1 and had felt 95% better. Thursday night he had acute onset of R chest pain into his shoulder and shortness of breath.  He was put on z pack prescribed from a virtual appointment yesterday.

## 2020-11-28 NOTE — ED AVS SNAPSHOT
[facility] Emergency Dept  [address] [place] [address]  Phone: [phone]  Fax: [phone]                                    Frandy Jerez   MRN: [id number]    Department: [facility] Emergency Dept   Date of Visit: 11/28/2020           After Visit Summary Signature Page    I have received my discharge instructions, and my questions have been answered. I have discussed any challenges I see with this plan with the nurse or doctor.    ..........................................................................................................................................  Patient/Patient Representative Signature      ..........................................................................................................................................  Patient Representative Print Name and Relationship to Patient    ..................................................               ................................................  Date                                   Time    ..........................................................................................................................................  Reviewed by Signature/Title    ...................................................              ..............................................  Date                                               Time          22EPIC Rev 08/18

## 2020-11-30 ENCOUNTER — TELEPHONE (OUTPATIENT)
Dept: FAMILY MEDICINE | Facility: OTHER | Age: 55
End: 2020-11-30

## 2020-11-30 NOTE — TELEPHONE ENCOUNTER
Phoned patient and informed him of documentation per Dr. Curtis EARL below.  Patient requested face to face and will take the offered office visit per Dr. Acevedo on 12/4/2020 at 3:00 pm.  Advised patient to call with any further questions or concerns.  Scheduled patient.  Patient stated understanding    Will close this encounter at this time.    Abiola Merlos RN

## 2020-11-30 NOTE — TELEPHONE ENCOUNTER
Reason for Call:  Same Day Appointment, Requested Provider:  Tk Acevedo    PCP: Tk Acevedo    Reason for visit: ED follow up    Duration of symptoms:     Have you been treated for this in the past?     Additional comments: pt needs to be seen this week. Not sure if a virtual is appropriate?    Can we leave a detailed message on this number? YES    Phone number patient can be reached at: Cell number on file:    Telephone Information:   Mobile [phone]       Best Time:     Call taken on 11/30/2020 at 9:55 AM by Kaitlynn Mchugh

## 2020-11-30 NOTE — TELEPHONE ENCOUNTER
Please call patient to triage symptoms.  It could be done virtually but if patient prefers face to face please schedule on 12/4/20 at 3:00 with me Face to Face.  Electronically signed by Tk Acevedo MD

## 2020-12-04 ENCOUNTER — TELEPHONE (OUTPATIENT)
Dept: FAMILY MEDICINE | Facility: OTHER | Age: 55
End: 2020-12-04

## 2020-12-04 ENCOUNTER — OFFICE VISIT (OUTPATIENT)
Dept: FAMILY MEDICINE | Facility: CLINIC | Age: 55
End: 2020-12-04
Payer: COMMERCIAL

## 2020-12-04 VITALS
SYSTOLIC BLOOD PRESSURE: 110 MMHG | OXYGEN SATURATION: 98 % | HEART RATE: 64 BPM | RESPIRATION RATE: 14 BRPM | WEIGHT: 192.25 LBS | DIASTOLIC BLOOD PRESSURE: 84 MMHG | TEMPERATURE: 97.5 F | BODY MASS INDEX: 25.48 KG/M2 | HEIGHT: 73 IN

## 2020-12-04 DIAGNOSIS — I26.99 OTHER ACUTE PULMONARY EMBOLISM WITHOUT ACUTE COR PULMONALE (H): Primary | ICD-10-CM

## 2020-12-04 PROCEDURE — 99213 OFFICE O/P EST LOW 20 MIN: CPT | Performed by: FAMILY MEDICINE

## 2020-12-04 RX ORDER — RIVAROXABAN 15 MG/1
15 TABLET, FILM COATED ORAL
COMMUNITY
Start: 2020-11-28

## 2020-12-04 RX ORDER — RIVAROXABAN 20 MG/1
20 TABLET, FILM COATED ORAL
Qty: 90 TABLET | Refills: 0 | Status: SHIPPED | OUTPATIENT
Start: 2020-12-04 | End: 2020-12-14

## 2020-12-04 RX ORDER — RIVAROXABAN 15 MG/1
15 TABLET, FILM COATED ORAL
Status: CANCELLED | OUTPATIENT
Start: 2020-12-04

## 2020-12-04 RX ORDER — RIVAROXABAN 20 MG/1
20 TABLET, FILM COATED ORAL
COMMUNITY
Start: 2020-11-28 | End: 2020-12-04

## 2020-12-04 ASSESSMENT — PAIN SCALES - GENERAL: PAINLEVEL: MILD PAIN (3)

## 2020-12-04 ASSESSMENT — MIFFLIN-ST. JEOR: SCORE: 1762.98

## 2020-12-04 NOTE — TELEPHONE ENCOUNTER
He was told to call and let Dr. Acevedo know the following:    Only 9 days of Xorelto, please send three month refill. [facility] in [place].

## 2020-12-04 NOTE — PROGRESS NOTES
"Subjective     Frandy Jerez is a 55 year old male who presents to clinic today for the following health issues:    HPI         ED/UC Followup:    Facility:  [facility]  Date of visit: 11/28/2020  Reason for visit: Acute pulmonary embolism   Current Status: little pain when taking in a deep breath, evenings run little fever 99.5, was having night sweats      Feeling like functionally he is back to his normal self. A rare dry cough and some pain with deep breaths but much improved from previous. He does have a trip to [place] planned next week where he is driving. We discussed frequent breaks as he is able but he will be on anticoagulation.     Review of Systems   Constitutional, HEENT, cardiovascular, pulmonary, GI, , musculoskeletal, neuro, skin, endocrine and psych systems are negative, except as otherwise noted.      Objective    /84 (BP Location: Right arm, Patient Position: Chair, Cuff Size: Adult Regular)   Pulse 64   Temp 97.5  F (36.4  C) (Temporal)   Resp 14   Ht 1.842 m (6' 0.5\")   Wt 87.2 kg (192 lb 4 oz)   SpO2 98%   BMI 25.72 kg/m    Body mass index is 25.72 kg/m .  Physical Exam   GENERAL: healthy, alert and no distress  EYES: Eyes grossly normal to inspection, PERRL and conjunctivae and sclerae normal  HENT: ear canals and TM's normal, nose and mouth without ulcers or lesions  NECK: no adenopathy, no asymmetry, masses, or scars and thyroid normal to palpation  RESP: lungs clear to auscultation - no rales, rhonchi or wheezes  CV: regular rate and rhythm, normal S1 S2, no S3 or S4, no murmur, click or rub, no peripheral edema and peripheral pulses strong  ABDOMEN: soft, nontender, no hepatosplenomegaly, no masses and bowel sounds normal  MS: no gross musculoskeletal defects noted, no edema  SKIN: no suspicious lesions or rashes  NEURO: Normal strength and tone, mentation intact and speech normal  PSYCH: mentation appears normal, affect normal/bright          Assessment & Plan     Other acute " "pulmonary embolism without acute cor pulmonale (H)  Plan to keep him on xarelto for 3 months given that this was most likely caused by COVID-19. No previous hx in himself or family of clotting issues. He will follow up with me prior to discontinuing this and if any new information is available we could consider extending his anticoagulation at that time.        BMI:   Estimated body mass index is 25.72 kg/m  as calculated from the following:    Height as of this encounter: 1.842 m (6' 0.5\").    Weight as of this encounter: 87.2 kg (192 lb 4 oz).            Return in about 2 months (around 2/4/2021) for Follow up, with me, in person.    Tk Acevedo MD  [facility]    "

## 2020-12-04 NOTE — TELEPHONE ENCOUNTER
This was routed to the Coumadin pool. This is not a medication we refill or manage.   Lucy Muller RN

## 2020-12-13 ENCOUNTER — HEALTH MAINTENANCE LETTER (OUTPATIENT)
Age: 55
End: 2020-12-13

## 2020-12-14 DIAGNOSIS — I26.99 OTHER ACUTE PULMONARY EMBOLISM WITHOUT ACUTE COR PULMONALE (H): ICD-10-CM

## 2020-12-14 RX ORDER — RIVAROXABAN 15 MG/1
15 TABLET, FILM COATED ORAL
Status: CANCELLED | OUTPATIENT
Start: 2020-12-14

## 2020-12-14 RX ORDER — RIVAROXABAN 20 MG/1
20 TABLET, FILM COATED ORAL
Qty: 90 TABLET | Refills: 0 | Status: SHIPPED | OUTPATIENT
Start: 2020-12-14

## 2020-12-14 NOTE — TELEPHONE ENCOUNTER
Reason for Call:  Medication or medication refill:    Do you use a [facility]?  Name of the pharmacy and phone number for the current request:      Name of the medication requested: XARELTO ANTICOAGULANT 15 MG TABS tablet - Pt left his 20 mg xarelto at home and Pt is on vacation and would like to see if his can Provider send another Rx to the [facility] in [facility][1]     Can we leave a detailed message on this number? YES    Phone number patient can be reached at: Cell number on file:    Telephone Information:   Mobile [phone]       Best Time: anytime    Call taken on 12/14/2020 at 3:14 PM by Gordo Harry

## 2020-12-16 ENCOUNTER — TELEPHONE (OUTPATIENT)
Dept: FAMILY MEDICINE | Facility: CLINIC | Age: 55
End: 2020-12-16

## 2020-12-16 NOTE — TELEPHONE ENCOUNTER
Medication Question or Refill    Who is calling: pt    What medication are you calling about (include dose and sig)?: XARELTO ANTICOAGULANT 20 MG TABS tablet    Controlled Substance Agreement on file: No    Who prescribed the medication?: pcp    Do you need a refill? Yes: only 1 day left    When did you use the medication last? today    Patient offered an appointment? No, out of town in [place]    Do you have any questions or concerns?  Yes: PT called in 12/14 and no one has gotten back to him in & the Pharmacy still doesn't have the script. Please call PT back ASAP as he's nervous about running out    Requested Pharmacy: [facility] [address] [place] [address]    Okay to leave a detailed message?: Yes at Cell number on file:    Telephone Information:   Mobile [phone]

## 2021-03-06 ENCOUNTER — MYC MEDICAL ADVICE (OUTPATIENT)
Dept: FAMILY MEDICINE | Facility: CLINIC | Age: 56
End: 2021-03-06

## 2021-09-26 ENCOUNTER — HEALTH MAINTENANCE LETTER (OUTPATIENT)
Age: 56
End: 2021-09-26

## 2022-01-16 ENCOUNTER — HEALTH MAINTENANCE LETTER (OUTPATIENT)
Age: 57
End: 2022-01-16

## 2023-01-14 ENCOUNTER — HEALTH MAINTENANCE LETTER (OUTPATIENT)
Age: 58
End: 2023-01-14

## 2023-04-23 ENCOUNTER — HEALTH MAINTENANCE LETTER (OUTPATIENT)
Age: 58
End: 2023-04-23